# Patient Record
Sex: FEMALE | Race: WHITE | NOT HISPANIC OR LATINO
[De-identification: names, ages, dates, MRNs, and addresses within clinical notes are randomized per-mention and may not be internally consistent; named-entity substitution may affect disease eponyms.]

---

## 2017-08-03 ENCOUNTER — APPOINTMENT (OUTPATIENT)
Dept: DERMATOLOGY | Facility: CLINIC | Age: 47
End: 2017-08-03
Payer: COMMERCIAL

## 2017-08-03 PROCEDURE — 17000 DESTRUCT PREMALG LESION: CPT

## 2017-08-03 PROCEDURE — 99214 OFFICE O/P EST MOD 30 MIN: CPT | Mod: 25

## 2018-01-18 ENCOUNTER — APPOINTMENT (OUTPATIENT)
Dept: FAMILY MEDICINE | Facility: CLINIC | Age: 48
End: 2018-01-18
Payer: COMMERCIAL

## 2018-01-18 ENCOUNTER — LABORATORY RESULT (OUTPATIENT)
Age: 48
End: 2018-01-18

## 2018-01-18 VITALS
HEART RATE: 91 BPM | OXYGEN SATURATION: 99 % | SYSTOLIC BLOOD PRESSURE: 120 MMHG | HEIGHT: 64 IN | BODY MASS INDEX: 21.51 KG/M2 | WEIGHT: 126 LBS | DIASTOLIC BLOOD PRESSURE: 80 MMHG

## 2018-01-18 DIAGNOSIS — Z23 ENCOUNTER FOR IMMUNIZATION: ICD-10-CM

## 2018-01-18 PROCEDURE — 36415 COLL VENOUS BLD VENIPUNCTURE: CPT

## 2018-01-18 PROCEDURE — G0008: CPT

## 2018-01-18 PROCEDURE — 90686 IIV4 VACC NO PRSV 0.5 ML IM: CPT

## 2018-01-18 PROCEDURE — 99396 PREV VISIT EST AGE 40-64: CPT | Mod: 25

## 2018-01-23 LAB
25(OH)D3 SERPL-MCNC: 32.1 NG/ML
ALBUMIN SERPL ELPH-MCNC: 4.4 G/DL
ALP BLD-CCNC: 62 U/L
ALT SERPL-CCNC: 17 U/L
ANION GAP SERPL CALC-SCNC: 16 MMOL/L
APPEARANCE: ABNORMAL
AST SERPL-CCNC: 28 U/L
BASOPHILS # BLD AUTO: 0.02 K/UL
BASOPHILS NFR BLD AUTO: 0.4 %
BILIRUB SERPL-MCNC: 0.7 MG/DL
BILIRUBIN URINE: NEGATIVE
BLOOD URINE: NEGATIVE
BUN SERPL-MCNC: 16 MG/DL
CALCIUM SERPL-MCNC: 9.8 MG/DL
CHLORIDE SERPL-SCNC: 104 MMOL/L
CHOLEST SERPL-MCNC: 238 MG/DL
CHOLEST/HDLC SERPL: 2.2 RATIO
CO2 SERPL-SCNC: 23 MMOL/L
COLOR: YELLOW
CREAT SERPL-MCNC: 0.95 MG/DL
EOSINOPHIL # BLD AUTO: 0.27 K/UL
EOSINOPHIL NFR BLD AUTO: 5.1 %
GLUCOSE QUALITATIVE U: NEGATIVE MG/DL
GLUCOSE SERPL-MCNC: 75 MG/DL
HBA1C MFR BLD HPLC: 5.3 %
HCT VFR BLD CALC: 44.6 %
HDLC SERPL-MCNC: 109 MG/DL
HGB BLD-MCNC: 14.3 G/DL
IMM GRANULOCYTES NFR BLD AUTO: 0.2 %
KETONES URINE: NEGATIVE
LDLC SERPL CALC-MCNC: 115 MG/DL
LEUKOCYTE ESTERASE URINE: NEGATIVE
LYMPHOCYTES # BLD AUTO: 1.94 K/UL
LYMPHOCYTES NFR BLD AUTO: 36.3 %
MAN DIFF?: NORMAL
MCHC RBC-ENTMCNC: 30.1 PG
MCHC RBC-ENTMCNC: 32.1 GM/DL
MCV RBC AUTO: 93.9 FL
MONOCYTES # BLD AUTO: 0.49 K/UL
MONOCYTES NFR BLD AUTO: 9.2 %
NEUTROPHILS # BLD AUTO: 2.61 K/UL
NEUTROPHILS NFR BLD AUTO: 48.8 %
NITRITE URINE: NEGATIVE
PH URINE: 7
PLATELET # BLD AUTO: 230 K/UL
POTASSIUM SERPL-SCNC: 4.9 MMOL/L
PROT SERPL-MCNC: 7.3 G/DL
PROTEIN URINE: ABNORMAL MG/DL
RBC # BLD: 4.75 M/UL
RBC # FLD: 13.3 %
SODIUM SERPL-SCNC: 143 MMOL/L
SPECIFIC GRAVITY URINE: 1.03
TRIGL SERPL-MCNC: 71 MG/DL
TSH SERPL-ACNC: 2.17 UIU/ML
UROBILINOGEN URINE: NEGATIVE MG/DL
WBC # FLD AUTO: 5.34 K/UL

## 2018-01-26 ENCOUNTER — APPOINTMENT (OUTPATIENT)
Dept: DERMATOLOGY | Facility: CLINIC | Age: 48
End: 2018-01-26
Payer: COMMERCIAL

## 2018-01-26 PROCEDURE — 99212 OFFICE O/P EST SF 10 MIN: CPT | Mod: 25

## 2018-01-26 PROCEDURE — 17000 DESTRUCT PREMALG LESION: CPT

## 2018-01-26 PROCEDURE — 17003 DESTRUCT PREMALG LES 2-14: CPT

## 2018-06-24 ENCOUNTER — TRANSCRIPTION ENCOUNTER (OUTPATIENT)
Age: 48
End: 2018-06-24

## 2018-07-31 ENCOUNTER — RESULT REVIEW (OUTPATIENT)
Age: 48
End: 2018-07-31

## 2018-07-31 ENCOUNTER — APPOINTMENT (OUTPATIENT)
Dept: DERMATOLOGY | Facility: CLINIC | Age: 48
End: 2018-07-31
Payer: COMMERCIAL

## 2018-07-31 PROCEDURE — 99214 OFFICE O/P EST MOD 30 MIN: CPT | Mod: 25

## 2018-07-31 PROCEDURE — 11100 BX SKIN SUBCUTANEOUS&/MUCOUS MEMBRANE 1 LESION: CPT

## 2018-08-14 ENCOUNTER — APPOINTMENT (OUTPATIENT)
Dept: DERMATOLOGY | Facility: CLINIC | Age: 48
End: 2018-08-14

## 2018-08-21 ENCOUNTER — RESULT REVIEW (OUTPATIENT)
Age: 48
End: 2018-08-21

## 2018-08-22 ENCOUNTER — APPOINTMENT (OUTPATIENT)
Dept: DERMATOLOGY | Facility: CLINIC | Age: 48
End: 2018-08-22
Payer: COMMERCIAL

## 2018-08-22 PROCEDURE — 13101 CMPLX RPR TRUNK 2.6-7.5 CM: CPT | Mod: 59

## 2018-08-22 PROCEDURE — 99213 OFFICE O/P EST LOW 20 MIN: CPT | Mod: 25

## 2018-08-22 PROCEDURE — 11402 EXC TR-EXT B9+MARG 1.1-2 CM: CPT | Mod: 59

## 2018-08-22 PROCEDURE — 17000 DESTRUCT PREMALG LESION: CPT

## 2018-09-04 ENCOUNTER — APPOINTMENT (OUTPATIENT)
Dept: DERMATOLOGY | Facility: CLINIC | Age: 48
End: 2018-09-04
Payer: COMMERCIAL

## 2018-09-04 PROCEDURE — ZZZZZ: CPT

## 2019-01-25 ENCOUNTER — APPOINTMENT (OUTPATIENT)
Dept: DERMATOLOGY | Facility: CLINIC | Age: 49
End: 2019-01-25
Payer: COMMERCIAL

## 2019-01-25 PROCEDURE — 17003 DESTRUCT PREMALG LES 2-14: CPT

## 2019-01-25 PROCEDURE — 17000 DESTRUCT PREMALG LESION: CPT

## 2019-01-25 PROCEDURE — 99213 OFFICE O/P EST LOW 20 MIN: CPT | Mod: 25

## 2019-02-05 ENCOUNTER — APPOINTMENT (OUTPATIENT)
Dept: DERMATOLOGY | Facility: CLINIC | Age: 49
End: 2019-02-05
Payer: COMMERCIAL

## 2019-02-05 PROCEDURE — 99212 OFFICE O/P EST SF 10 MIN: CPT

## 2019-03-18 ENCOUNTER — LABORATORY RESULT (OUTPATIENT)
Age: 49
End: 2019-03-18

## 2019-03-18 ENCOUNTER — APPOINTMENT (OUTPATIENT)
Dept: FAMILY MEDICINE | Facility: CLINIC | Age: 49
End: 2019-03-18
Payer: COMMERCIAL

## 2019-03-18 VITALS
WEIGHT: 130 LBS | HEIGHT: 64 IN | HEART RATE: 86 BPM | SYSTOLIC BLOOD PRESSURE: 118 MMHG | BODY MASS INDEX: 22.2 KG/M2 | DIASTOLIC BLOOD PRESSURE: 76 MMHG

## 2019-03-18 DIAGNOSIS — M19.079 PRIMARY OSTEOARTHRITIS, UNSPECIFIED ANKLE AND FOOT: ICD-10-CM

## 2019-03-18 PROCEDURE — 99396 PREV VISIT EST AGE 40-64: CPT | Mod: 25

## 2019-03-18 PROCEDURE — 36415 COLL VENOUS BLD VENIPUNCTURE: CPT

## 2019-03-18 NOTE — HEALTH RISK ASSESSMENT
[Very Good] : ~his/her~  mood as very good [Intercurrent Urgi Care visits] : went to urgent care [No falls in past year] : Patient reported no falls in the past year [0] : 2) Feeling down, depressed, or hopeless: Not at all (0) [Patient reported mammogram was normal] : Patient reported mammogram was normal [None] : None [With Family] : lives with family [Employed] : employed [] :  [# Of Children ___] : has [unfilled] children [Reports changes in vision] : Reports changes in vision [Smoke Detector] : smoke detector [Seat Belt] :  uses seat belt [Sunscreen] : uses sunscreen [] : No [de-identified] : for Gastro illness  [de-identified] : GYN  Dr Alas   annually    DERM   DR. Silva  2-3 times per year  BENIGN LEsions  [de-identified] : GYM 5-6 days per week  HIT and Raul  [de-identified] : no processed foods    [[id number]] : 0 [Change in mental status noted] : No change in mental status noted [Reports changes in hearing] : Reports no changes in hearing [Reports changes in dental health] : Reports no changes in dental health [Guns at Home] : no guns at home [MammogramDate] : 5/18  [FreeTextEntry2] : 6 th    SCIENCE and READING  [de-identified] : now wears glasses for driving

## 2019-03-18 NOTE — PAST MEDICAL HISTORY
[Menstruating] : hx of menstruating [Menarche Age ____] : age at menarche was [unfilled] [Regular Cycle Intervals] : have been regular [Total Preg ___] : G: [unfilled] [Live Births___] : P: [unfilled]

## 2019-03-18 NOTE — PHYSICAL EXAM
[General Appearance - Alert] : alert [General Appearance - In No Acute Distress] : in no acute distress [General Appearance - Well-Appearing] : healthy appearing [Sclera] : the sclera and conjunctiva were normal [PERRL With Normal Accommodation] : pupils were equal in size, round, and reactive to light [Both Tympanic Membranes Were Examined] : both tympanic membranes were normal [Extraocular Movements] : extraocular movements were intact [Oropharynx] : the oropharynx was normal [Nasal Cavity] : the nasal mucosa and septum were normal [Thyroid Diffuse Enlargement] : the thyroid was not enlarged [Respiration, Rhythm And Depth] : normal respiratory rhythm and effort [Auscultation Breath Sounds / Voice Sounds] : lungs were clear to auscultation bilaterally [Heart Rate And Rhythm] : heart rate was normal and rhythm regular [Heart Sounds] : normal S1 and S2 [Murmurs] : no murmurs [Full Pulse] : the pedal pulses are present [Edema] : there was no peripheral edema [Abdomen Soft] : soft [Abdomen Tenderness] : non-tender [Abdomen Mass (___ Cm)] : no abdominal mass palpated [Cervical Lymph Nodes Enlarged Posterior Bilaterally] : posterior cervical [Cervical Lymph Nodes Enlarged Anterior Bilaterally] : anterior cervical [Supraclavicular Lymph Nodes Enlarged Bilaterally] : supraclavicular [No Spinal Tenderness] : no spinal tenderness [Abnormal Walk] : normal gait [Involuntary Movements] : no involuntary movements were seen [Skin Turgor] : normal skin turgor [] : no rash [No Focal Deficits] : no focal deficits [Deep Tendon Reflexes (DTR)] : deep tendon reflexes were 2+ and symmetric [Impaired Insight] : insight and judgment were intact [Oriented To Time, Place, And Person] : oriented to person, place, and time [FreeTextEntry1] :  EKG  2015   unchanged from EKG patient provided from 2009 encounter with cardiology.

## 2019-03-18 NOTE — REVIEW OF SYSTEMS
[Anxiety] : anxiety [Negative] : Neurological [Fever] : no fever [Chills] : no chills [Red Eyes] : eyes not red [Eyesight Problems] : no eyesight problems [Depression] : no depression

## 2019-03-18 NOTE — HISTORY OF PRESENT ILLNESS
[Health Maintenance] : health maintenance [GYN Evaluation] : gynecology [___ Year(s) Ago] : [unfilled] year(s) ago [] :  [Working Full Time] : working full time [Never Smoked Cigarettes] : has never smoked cigarettes [Rare Use] : rare alcohol use [Good] : good [Reg. Dental Visits] : She has regular dental visits [Vision Problems] : She denies vision problems [Hearing Loss] : She denies hearing loss [Healthy Diet] : She consumes a diverse and healthy diet [Menstrual Problems] : she reports normal menses [Regular Exercise] : She exercises regularly [Contraception] : she uses contraception [Sexually Active] : she is sexually active [de-identified] :   [de-identified] : MAMMOGRAPHY   As per GYN   Dr. Alas     March 2017   [place]   will request. \par \par Derm    Septum   2-3 times per year   no suspicious lesions  [FreeTextEntry8] : Continues to Runs 3 miles most days of the week on treadmill;  participates in RACES [FreeTextEntry1] : \par \par  [de-identified] : Since has been well .\par Continues to struggle a bit with anxiety most especially with regard to her health and weight.\par Did have injuries and followed with podiatry for plantar fascitis and bone spur.\par Pt and rest from exercise and fully resolved. \par \par Migraines episodes relieved by Ibuprofen.  NO specific pattern.  \par \par Has resumed running.

## 2019-03-18 NOTE — DISCUSSION/SUMMARY
[Normal Weight (BMI <25)] : normal weight - BMI <25 [Mediterranean diet recommended] : Mediterranean diet recommended [Resistance training 2 days per week recommended] : Resistance training 2 days per week recommended [Non - Smoker] : non-smoker [FreeTextEntry2] : Continue CURRENT  activities   [FreeTextEntry3] : Advised increased vegetables, fiber, fruits [FreeTextEntry1] : Well exam fro 47 year old WF with PMH of anxiety and migraines doing well. \par \par See HPI and Plan  \par \par Referral given to podiatry \par LAbs in office today.\par Flu Vac given

## 2019-03-18 NOTE — ASSESSMENT
[FreeTextEntry1] :  Well exam for 48  year old WF with PMH as stated in HPI / active list. \par \par Management : \par \par \par See HPI and Plan\par \par Labs in office today.   Will advise. \par \par Best wishes offered !\par

## 2019-03-20 LAB
ALBUMIN SERPL ELPH-MCNC: 4.2 G/DL
ALP BLD-CCNC: 56 U/L
ALT SERPL-CCNC: 19 U/L
ANION GAP SERPL CALC-SCNC: 11 MMOL/L
APPEARANCE: ABNORMAL
AST SERPL-CCNC: 26 U/L
BASOPHILS # BLD AUTO: 0.05 K/UL
BASOPHILS NFR BLD AUTO: 1 %
BILIRUB SERPL-MCNC: 0.3 MG/DL
BILIRUBIN URINE: NEGATIVE
BLOOD URINE: NEGATIVE
BUN SERPL-MCNC: 15 MG/DL
CALCIUM SERPL-MCNC: 9 MG/DL
CHLORIDE SERPL-SCNC: 106 MMOL/L
CO2 SERPL-SCNC: 23 MMOL/L
COLOR: YELLOW
CREAT SERPL-MCNC: 0.97 MG/DL
EOSINOPHIL # BLD AUTO: 0.48 K/UL
EOSINOPHIL NFR BLD AUTO: 9.9 %
GLUCOSE QUALITATIVE U: NEGATIVE
GLUCOSE SERPL-MCNC: 79 MG/DL
HCT VFR BLD CALC: 42.2 %
HGB BLD-MCNC: 13.2 G/DL
IMM GRANULOCYTES NFR BLD AUTO: 0 %
KETONES URINE: NEGATIVE
LEUKOCYTE ESTERASE URINE: NEGATIVE
LYMPHOCYTES # BLD AUTO: 1.84 K/UL
LYMPHOCYTES NFR BLD AUTO: 37.9 %
MAN DIFF?: NORMAL
MCHC RBC-ENTMCNC: 30.1 PG
MCHC RBC-ENTMCNC: 31.3 GM/DL
MCV RBC AUTO: 96.3 FL
MONOCYTES # BLD AUTO: 0.51 K/UL
MONOCYTES NFR BLD AUTO: 10.5 %
NEUTROPHILS # BLD AUTO: 1.98 K/UL
NEUTROPHILS NFR BLD AUTO: 40.7 %
NITRITE URINE: NEGATIVE
PH URINE: 5.5
PLATELET # BLD AUTO: 213 K/UL
POTASSIUM SERPL-SCNC: 4.2 MMOL/L
PROT SERPL-MCNC: 6.5 G/DL
PROTEIN URINE: NEGATIVE
RBC # BLD: 4.38 M/UL
RBC # FLD: 13.2 %
SODIUM SERPL-SCNC: 140 MMOL/L
SPECIFIC GRAVITY URINE: 1.02
UROBILINOGEN URINE: NORMAL
WBC # FLD AUTO: 4.86 K/UL

## 2019-07-18 ENCOUNTER — APPOINTMENT (OUTPATIENT)
Dept: FAMILY MEDICINE | Facility: CLINIC | Age: 49
End: 2019-07-18
Payer: COMMERCIAL

## 2019-07-18 VITALS
WEIGHT: 131 LBS | OXYGEN SATURATION: 99 % | HEART RATE: 66 BPM | HEIGHT: 64 IN | BODY MASS INDEX: 22.36 KG/M2 | SYSTOLIC BLOOD PRESSURE: 122 MMHG | DIASTOLIC BLOOD PRESSURE: 70 MMHG

## 2019-07-18 PROCEDURE — 99213 OFFICE O/P EST LOW 20 MIN: CPT

## 2019-07-24 NOTE — REVIEW OF SYSTEMS
[Anxiety] : anxiety [Negative] : Gastrointestinal [de-identified] : "worried it could be MS  or something serious"

## 2019-07-24 NOTE — HISTORY OF PRESENT ILLNESS
[FreeTextEntry8] : Last seen for CPE in [facility] and encounter reviewed.\par SInce continues to be very active in physical activity runs alternates with walking.\par \par In recent weeks has some heightened concerns for a sense of numbness "pins and needles" intermittently on shin area of Left limb.\par No weakness . Continues her exercise. \par

## 2019-07-24 NOTE — PHYSICAL EXAM
[No Acute Distress] : no acute distress [No Respiratory Distress] : no respiratory distress  [Normal Sclera/Conjunctiva] : normal sclera/conjunctiva [Normal Rate] : normal rate  [Regular Rhythm] : with a regular rhythm [No Accessory Muscle Use] : no accessory muscle use [No Varicosities] : no varicosities [No Edema] : there was no peripheral edema [Pedal Pulses Present] : the pedal pulses are present [No Extremity Clubbing/Cyanosis] : no extremity clubbing/cyanosis [No Focal Deficits] : no focal deficits [Grossly Normal Strength/Tone] : grossly normal strength/tone [No Joint Swelling] : no joint swelling [Deep Tendon Reflexes (DTR)] : deep tendon reflexes were 2+ and symmetric [Normal Gait] : normal gait [Normal Insight/Judgement] : insight and judgment were intact [Alert and Oriented x3] : oriented to person, place, and time [de-identified] : smiling   a bit anxious Self Reports  [de-identified] : MERARY [de-identified] : PIN test to LLE  fully intact to sharp/dull testing

## 2019-07-24 NOTE — ASSESSMENT
[FreeTextEntry1] : At this time she is reassured not likely serious.\par Advised to continue to monitor for resolution or worsening and call me if concerns persist.

## 2019-08-16 ENCOUNTER — APPOINTMENT (OUTPATIENT)
Dept: DERMATOLOGY | Facility: CLINIC | Age: 49
End: 2019-08-16
Payer: COMMERCIAL

## 2019-08-16 PROCEDURE — 17000 DESTRUCT PREMALG LESION: CPT

## 2019-08-16 PROCEDURE — 99214 OFFICE O/P EST MOD 30 MIN: CPT | Mod: 25

## 2019-08-16 PROCEDURE — 17003 DESTRUCT PREMALG LES 2-14: CPT

## 2019-09-23 ENCOUNTER — MEDICATION RENEWAL (OUTPATIENT)
Age: 49
End: 2019-09-23

## 2020-04-09 ENCOUNTER — APPOINTMENT (OUTPATIENT)
Dept: FAMILY MEDICINE | Facility: CLINIC | Age: 50
End: 2020-04-09
Payer: COMMERCIAL

## 2020-04-09 PROCEDURE — G2012 BRIEF CHECK IN BY MD/QHP: CPT

## 2020-07-16 ENCOUNTER — APPOINTMENT (OUTPATIENT)
Dept: FAMILY MEDICINE | Facility: CLINIC | Age: 50
End: 2020-07-16
Payer: COMMERCIAL

## 2020-07-16 VITALS
HEART RATE: 90 BPM | TEMPERATURE: 98.5 F | WEIGHT: 123 LBS | SYSTOLIC BLOOD PRESSURE: 100 MMHG | HEIGHT: 64 IN | BODY MASS INDEX: 21 KG/M2 | DIASTOLIC BLOOD PRESSURE: 80 MMHG

## 2020-07-16 DIAGNOSIS — K64.9 UNSPECIFIED HEMORRHOIDS: ICD-10-CM

## 2020-07-16 DIAGNOSIS — Z87.898 PERSONAL HISTORY OF OTHER SPECIFIED CONDITIONS: ICD-10-CM

## 2020-07-16 PROCEDURE — 36415 COLL VENOUS BLD VENIPUNCTURE: CPT

## 2020-07-16 PROCEDURE — 81003 URINALYSIS AUTO W/O SCOPE: CPT | Mod: QW

## 2020-07-16 PROCEDURE — 99396 PREV VISIT EST AGE 40-64: CPT | Mod: 25

## 2020-07-19 NOTE — ASSESSMENT
[FreeTextEntry1] :  Well exam for 49 year old WF  with PMH as stated in HPI / active list. \par \par Management : \par \par \par See HPI and Plan\par \par Labs in office today.   Will advise. \par \par Best wishes offered !\par

## 2020-07-19 NOTE — PHYSICAL EXAM
[No Acute Distress] : no acute distress [Normal Sclera/Conjunctiva] : normal sclera/conjunctiva [No Accessory Muscle Use] : no accessory muscle use [No Respiratory Distress] : no respiratory distress  [No Varicosities] : no varicosities [Regular Rhythm] : with a regular rhythm [Normal Rate] : normal rate  [No Edema] : there was no peripheral edema [Pedal Pulses Present] : the pedal pulses are present [No Extremity Clubbing/Cyanosis] : no extremity clubbing/cyanosis [No Joint Swelling] : no joint swelling [No Focal Deficits] : no focal deficits [Normal Gait] : normal gait [Grossly Normal Strength/Tone] : grossly normal strength/tone [Alert and Oriented x3] : oriented to person, place, and time [Deep Tendon Reflexes (DTR)] : deep tendon reflexes were 2+ and symmetric [Normal Insight/Judgement] : insight and judgment were intact [de-identified] : smiling   a bit anxious Self Reports  [de-identified] : MERARY [de-identified] : PIN test to LLE  fully intact to sharp/dull testing

## 2020-07-19 NOTE — HEALTH RISK ASSESSMENT
[Excellent] : ~his/her~  mood as  excellent [FreeTextEntry1] : COVID [] : No [Yes] : Yes [2 - 4 times a month (2 pts)] : 2-4 times a month (2 points) [1 or 2 (0 pts)] : 1 or 2 (0 points) [Never (0 pts)] : Never (0 points) [No falls in past year] : Patient reported no falls in the past year [de-identified] : Denies [de-identified] : GYN [de-identified] : WALKS  "almost every day"  [de-identified] : Healthy !  [Patient reported mammogram was normal] : Patient reported mammogram was normal [Patient reported PAP Smear was normal] : Patient reported PAP Smear was normal [Change in mental status noted] : No change in mental status noted [None] : None [# of Members in Household ___] :  household currently consist of [unfilled] member(s) [With Family] : lives with family [] :  [# Of Children ___] : has [unfilled] children [Feels Safe at Home] : Feels safe at home [Graduate School] : graduate school [Reports changes in dental health] : Reports no changes in dental health [Fully functional (bathing, dressing, toileting, transferring, walking, feeding)] : Fully functional (bathing, dressing, toileting, transferring, walking, feeding) [Fully functional (using the telephone, shopping, preparing meals, housekeeping, doing laundry, using] : Fully functional and needs no help or supervision to perform IADLs (using the telephone, shopping, preparing meals, housekeeping, doing laundry, using transportation, managing medications and managing finances) [Carbon Monoxide Detector] : carbon monoxide detector [Smoke Detector] : smoke detector [Guns at Home] : no guns at home [Travel to Developing Areas] : does not  travel to developing areas [MammogramDate] : 6/ 2019  [Seat Belt] :  uses seat belt [PapSmearDate] : 2019  [MammogramComments] : self report   will request  [ColonoscopyDate] : will  refer

## 2020-07-19 NOTE — HISTORY OF PRESENT ILLNESS
[FreeTextEntry8] : Last seen for CPE in [facility] and encounter reviewed.\par SInce continues to be very active in physical activity runs alternates with walking.\par \par In recent weeks has some heightened concerns for a sense of numbness "pins and needles" intermittently on shin area of Left limb.\par No weakness . Continues her exercise. \par  [FreeTextEntry1] : CPE \par Last seen for same March 2019  [de-identified] : Seen for acute visit in JULY ; condition resolved.\par Telehealth visit April for refill of Prozac and encounter reviewed. Doing well! 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 \par Stressed but walks everyday.  Stopped running.  \par

## 2020-07-20 LAB
ALBUMIN SERPL ELPH-MCNC: 4.8 G/DL
ALP BLD-CCNC: 54 U/L
ALT SERPL-CCNC: 15 U/L
ANION GAP SERPL CALC-SCNC: 15 MMOL/L
AST SERPL-CCNC: 28 U/L
BASOPHILS # BLD AUTO: 0.03 K/UL
BASOPHILS NFR BLD AUTO: 0.5 %
BILIRUB SERPL-MCNC: 0.5 MG/DL
BILIRUB UR QL STRIP: NEGATIVE
BUN SERPL-MCNC: 16 MG/DL
CALCIUM SERPL-MCNC: 9.4 MG/DL
CHLORIDE SERPL-SCNC: 103 MMOL/L
CHOLEST SERPL-MCNC: 226 MG/DL
CHOLEST/HDLC SERPL: 2.1 RATIO
CLARITY UR: CLEAR
CO2 SERPL-SCNC: 23 MMOL/L
COLLECTION METHOD: NORMAL
CREAT SERPL-MCNC: 0.95 MG/DL
EOSINOPHIL # BLD AUTO: 0.21 K/UL
EOSINOPHIL NFR BLD AUTO: 3.5 %
ESTIMATED AVERAGE GLUCOSE: 103 MG/DL
GLUCOSE SERPL-MCNC: 73 MG/DL
GLUCOSE UR-MCNC: NEGATIVE
HBA1C MFR BLD HPLC: 5.2 %
HCG UR QL: 0.2 EU/DL
HCT VFR BLD CALC: 45.3 %
HDLC SERPL-MCNC: 110 MG/DL
HGB BLD-MCNC: 13.8 G/DL
HGB UR QL STRIP.AUTO: NEGATIVE
IMM GRANULOCYTES NFR BLD AUTO: 0.2 %
KETONES UR-MCNC: ABNORMAL
LDLC SERPL CALC-MCNC: 105 MG/DL
LEUKOCYTE ESTERASE UR QL STRIP: NEGATIVE
LYMPHOCYTES # BLD AUTO: 1.76 K/UL
LYMPHOCYTES NFR BLD AUTO: 29.5 %
MAN DIFF?: NORMAL
MCHC RBC-ENTMCNC: 29.8 PG
MCHC RBC-ENTMCNC: 30.5 GM/DL
MCV RBC AUTO: 97.8 FL
MONOCYTES # BLD AUTO: 0.66 K/UL
MONOCYTES NFR BLD AUTO: 11.1 %
NEUTROPHILS # BLD AUTO: 3.29 K/UL
NEUTROPHILS NFR BLD AUTO: 55.2 %
NITRITE UR QL STRIP: NEGATIVE
PH UR STRIP: 6
PLATELET # BLD AUTO: 225 K/UL
POTASSIUM SERPL-SCNC: 4.4 MMOL/L
PROT SERPL-MCNC: 7 G/DL
PROT UR STRIP-MCNC: NEGATIVE
RBC # BLD: 4.63 M/UL
RBC # FLD: 13 %
SODIUM SERPL-SCNC: 141 MMOL/L
SP GR UR STRIP: 1.02
TRIGL SERPL-MCNC: 55 MG/DL
TSH SERPL-ACNC: 1.4 UIU/ML
WBC # FLD AUTO: 5.96 K/UL

## 2020-08-07 LAB — HEMOCCULT STL QL IA: NEGATIVE

## 2021-03-04 ENCOUNTER — APPOINTMENT (OUTPATIENT)
Dept: ORTHOPEDIC SURGERY | Facility: CLINIC | Age: 51
End: 2021-03-04
Payer: COMMERCIAL

## 2021-03-04 VITALS
HEIGHT: 64 IN | WEIGHT: 125 LBS | BODY MASS INDEX: 21.34 KG/M2 | DIASTOLIC BLOOD PRESSURE: 83 MMHG | SYSTOLIC BLOOD PRESSURE: 129 MMHG | HEART RATE: 88 BPM

## 2021-03-04 PROCEDURE — 73564 X-RAY EXAM KNEE 4 OR MORE: CPT | Mod: RT

## 2021-03-04 PROCEDURE — 99204 OFFICE O/P NEW MOD 45 MIN: CPT

## 2021-03-04 PROCEDURE — 99072 ADDL SUPL MATRL&STAF TM PHE: CPT

## 2021-03-04 NOTE — PHYSICAL EXAM
[de-identified] : The patient appears well nourished  and in no apparent distress.  The patient is alert and oriented to person, place, and time.   Affect and mood appear normal. The head is normocephalic and atraumatic.  The eyes reveal normal sclera and extra ocular muscles are intact. The tongue is midline with no apparent lesions.  Skin shows normal turgor with no evidence of eczema or psoriasis.  No respiratory distress noted.  Sensation grossly intact.\par   [de-identified] : Exam of the right knee shows a small effusion, full extension, no posterolateral joint line tenderness, pain over the anterior horn lateral meniscus with palpation, flexion of 140 degrees. 5/5 motor strength bilaterally distally. Sensation intact distally.  [de-identified] : Xray- 4 views of the right knee shows minimal arthritis of the patellofemoral compartment of the right knee.

## 2021-03-04 NOTE — CONSULT LETTER
[Dear  ___] : Dear  [unfilled], [Consult Letter:] : I had the pleasure of evaluating your patient, [unfilled]. [Please see my note below.] : Please see my note below. [Consult Closing:] : Thank you very much for allowing me to participate in the care of this patient.  If you have any questions, please do not hesitate to contact me. [Sincerely,] : Sincerely, [FreeTextEntry2] : ONEIL TAYLOR MD\par  [FreeTextEntry3] : Rosas Blair MD\par Chief of Joint Replacement\par [facility]\par [facility]\par

## 2021-03-04 NOTE — DISCUSSION/SUMMARY
[de-identified] : The patient is a 50 year old female with a possible lateral meniscus tear of the right knee. She was sent for an MRI of the right knee to further evaluate for this. In the meantime we discussed the use of over-the-counter anti-inflammatories as well as activity modification and ice as needed. She will follow up with the results of the MRI for further treatment options.

## 2021-03-04 NOTE — ADDENDUM
[FreeTextEntry1] : This note was authored by Casper Gutierrez working as a medical scribe for Dr. Rosas Blair. The note was reviewed, edited, and revised by Dr. Rosas Blair whom is in agreement with the exam findings, imaging findings, and treatment plan. 03/04/2021.

## 2021-03-04 NOTE — HISTORY OF PRESENT ILLNESS
[de-identified] : The patient is a 50 year old female being seen for evaluation of her right knee. She reports in January feeling a painful sensation while walking on the treadmill. She denies specific injury or trauma. She reports mild swelling following initial onset of pain. She reports pain is localized to the lateral aspect of the knee. She notes pain has remained intermittent over the last 2 months. She reports increased pain with activity and especially while extending the knee from a flexed position. She notes pain with torsional movements. She reports utilizing anti-inflammatories with mild relief of her symptoms. She comes in today for evaluation of her right knee.

## 2021-03-11 ENCOUNTER — OUTPATIENT (OUTPATIENT)
Dept: OUTPATIENT SERVICES | Facility: HOSPITAL | Age: 51
LOS: 1 days | End: 2021-03-11

## 2021-03-11 ENCOUNTER — APPOINTMENT (OUTPATIENT)
Dept: MRI IMAGING | Facility: CLINIC | Age: 51
End: 2021-03-11
Payer: COMMERCIAL

## 2021-03-11 DIAGNOSIS — S83.281A OTHER TEAR OF LATERAL MENISCUS, CURRENT INJURY, RIGHT KNEE, INITIAL ENCOUNTER: ICD-10-CM

## 2021-03-11 PROCEDURE — 73721 MRI JNT OF LWR EXTRE W/O DYE: CPT | Mod: 26,RT

## 2021-03-11 PROCEDURE — 70030 X-RAY EYE FOR FOREIGN BODY: CPT | Mod: 26,50

## 2021-03-19 ENCOUNTER — APPOINTMENT (OUTPATIENT)
Dept: ORTHOPEDIC SURGERY | Facility: CLINIC | Age: 51
End: 2021-03-19
Payer: COMMERCIAL

## 2021-03-19 PROCEDURE — 99213 OFFICE O/P EST LOW 20 MIN: CPT | Mod: 95

## 2021-03-19 NOTE — DISCUSSION/SUMMARY
[de-identified] : The patient is a 50 year old female with a lateral meniscus tear of the right knee. She was given a prescription for anti-inflammatories. I recommended a course of Mobic. The patient was given a prescription for the Mobic with directions. She was instructed to stop the medicine and call the office if there are any adverse reaction to the medicine. She was also instructed to consult with their primary care doctor prior to starting the medication. \par  \par She was advised that she may benefit from a right knee arthroscopy with partial meniscectomy. Risks, benefits and alternatives to surgery were discussed and all questions were answered. Recovery from surgery was also discussed. The percentages of success in an arthroscopy that involves a torn meniscus and arthritic changes is dependent upon how bad the arthritic changes are. Basically, removing a meniscal tear allows us to ascertain how bad the patient's articular cartilage destruction (arthritis) is. The arthroscopy cleans out any debris from the arthritic process as well as removing the meniscal tear. Approximately 75% of the patients will say that they feel relief, although their x-rays will continue to show significant arthritic changes. Arthroscopy for arthritis is a temporizing procedure, yielding subjective success (patient satisfaction) for less than two to five years. In some cases, the knee might eventually require a knee replacement for symptomatic relief. The prognostic factors that are somewhat favorable predictive values in arthroscopic debridements (removal of loose articular cartilage, loose body and inflamed synovium) of an arthritic knee are: short duration of symptoms, effusion (swelling), minimal deformity and good range of motion. The complications with any arthroscopy include the risk of anaesthetic complications and death, blood clots and pulmonary embolus, infection (less than 1%), nerve damage, by which we would mean a peroneal palsy (less than 0.1%) (small area of skin numbness is so common, we do not consider its presence a complication), injury to the popliteal artery, which is so rare that there are no statistics, but should it occur could theoretically lead to amputation, which is extremely unlikely. There is often a chance of getting a hemarthrosis (blood in the joint) but this usually resolves with local measures of icing, physical therapy, and aspiration. Reflex sympathetic dystrophy (RSD) is another extremely rare but theoretical complication. This (RSD) means that the patient has a stiff painful joint that is out of proportion to the objective pathology of the knee. Subsequently, it might require years of physical therapy before one regains a functional knee with RSD. Infrapatellar contracture syndrome (stiff joint) is sometimes reported and associated with RSD, but it usually is a result of not being aggressive in physical therapy. I think the patient understands the risk benefit ratio of arthroscopy and will think about whether they would prefer the nonoperative or surgical treatment option.

## 2021-03-19 NOTE — HISTORY OF PRESENT ILLNESS
[Home] : at home, [unfilled] , at the time of the visit. [Medical Office: ([facility])___] : at the medical office located in  [de-identified] : The patient is a 50 year old female being seen via telehealth for evaluation of her right knee. Last seen in the office 2 weeks ago at which time she was sent for an MRI to evaluate for a possible lateral meniscus tear of the right knee. She reports undergoing the MRI and results are available in the office for review today. She reports continued frequent pain since her previous visit. She notes pain is increased with weightbearing activities. She continues to have pain with weightbearing activities and torsional movements. She is being seen today for review of the results of the MRI and for treatment options.

## 2021-03-19 NOTE — REASON FOR VISIT
[Follow-Up Visit] : a follow-up visit for [Osteoarthritis, Knee] : osteoarthritis, knee [FreeTextEntry2] : MRI review.

## 2021-03-19 NOTE — PHYSICAL EXAM
[de-identified] : The patient is alert and oriented to person, place, and time. Affect and mood appear normal. No respiratory distress noted.  The patient speaks with normal nonpressured speech. [de-identified] : MRI - performed at [facility] on 3/11/2021 of the right knee- \par \par IMPRESSION:\par Nondisplaced undersurface tear of the anterior horn involving the peripheral substance of the lateral meniscus.\par Edema between the iliotibial band and lateral femur, which can be seen in the setting of iliotibial band friction syndrome.\par

## 2021-03-19 NOTE — ADDENDUM
[FreeTextEntry1] : This note was authored by Casper Gutierrez working as a medical scribe for Dr. Rosas Blair. The note was reviewed, edited, and revised by Dr. Rosas Blair whom is in agreement with the exam findings, imaging findings, and treatment plan. 03/19/2021.

## 2021-03-21 ENCOUNTER — RX RENEWAL (OUTPATIENT)
Age: 51
End: 2021-03-21

## 2021-03-23 ENCOUNTER — OUTPATIENT (OUTPATIENT)
Dept: OUTPATIENT SERVICES | Facility: HOSPITAL | Age: 51
LOS: 1 days | End: 2021-03-23
Payer: COMMERCIAL

## 2021-03-23 VITALS
HEART RATE: 74 BPM | WEIGHT: 128.31 LBS | TEMPERATURE: 97 F | DIASTOLIC BLOOD PRESSURE: 80 MMHG | RESPIRATION RATE: 81 BRPM | SYSTOLIC BLOOD PRESSURE: 140 MMHG | HEIGHT: 63 IN

## 2021-03-23 DIAGNOSIS — Z01.818 ENCOUNTER FOR OTHER PREPROCEDURAL EXAMINATION: ICD-10-CM

## 2021-03-23 DIAGNOSIS — S83.281A OTHER TEAR OF LATERAL MENISCUS, CURRENT INJURY, RIGHT KNEE, INITIAL ENCOUNTER: ICD-10-CM

## 2021-03-23 DIAGNOSIS — Z29.9 ENCOUNTER FOR PROPHYLACTIC MEASURES, UNSPECIFIED: ICD-10-CM

## 2021-03-23 LAB
ANION GAP SERPL CALC-SCNC: 9 MMOL/L — SIGNIFICANT CHANGE UP (ref 5–17)
APTT BLD: 29.2 SEC — SIGNIFICANT CHANGE UP (ref 27.5–35.5)
BASOPHILS # BLD AUTO: 0.03 K/UL — SIGNIFICANT CHANGE UP (ref 0–0.2)
BASOPHILS NFR BLD AUTO: 0.4 % — SIGNIFICANT CHANGE UP (ref 0–2)
BUN SERPL-MCNC: 22 MG/DL — HIGH (ref 8–20)
CALCIUM SERPL-MCNC: 8.7 MG/DL — SIGNIFICANT CHANGE UP (ref 8.6–10.2)
CHLORIDE SERPL-SCNC: 102 MMOL/L — SIGNIFICANT CHANGE UP (ref 98–107)
CO2 SERPL-SCNC: 26 MMOL/L — SIGNIFICANT CHANGE UP (ref 22–29)
CREAT SERPL-MCNC: 0.9 MG/DL — SIGNIFICANT CHANGE UP (ref 0.5–1.3)
EOSINOPHIL # BLD AUTO: 0.26 K/UL — SIGNIFICANT CHANGE UP (ref 0–0.5)
EOSINOPHIL NFR BLD AUTO: 3.7 % — SIGNIFICANT CHANGE UP (ref 0–6)
GLUCOSE SERPL-MCNC: 81 MG/DL — SIGNIFICANT CHANGE UP (ref 70–99)
HCT VFR BLD CALC: 43.1 % — SIGNIFICANT CHANGE UP (ref 34.5–45)
HGB BLD-MCNC: 13.3 G/DL — SIGNIFICANT CHANGE UP (ref 11.5–15.5)
IMM GRANULOCYTES NFR BLD AUTO: 0.3 % — SIGNIFICANT CHANGE UP (ref 0–1.5)
INR BLD: 1.08 RATIO — SIGNIFICANT CHANGE UP (ref 0.88–1.16)
LYMPHOCYTES # BLD AUTO: 2.09 K/UL — SIGNIFICANT CHANGE UP (ref 1–3.3)
LYMPHOCYTES # BLD AUTO: 29.4 % — SIGNIFICANT CHANGE UP (ref 13–44)
MCHC RBC-ENTMCNC: 28.4 PG — SIGNIFICANT CHANGE UP (ref 27–34)
MCHC RBC-ENTMCNC: 30.9 GM/DL — LOW (ref 32–36)
MCV RBC AUTO: 91.9 FL — SIGNIFICANT CHANGE UP (ref 80–100)
MONOCYTES # BLD AUTO: 0.66 K/UL — SIGNIFICANT CHANGE UP (ref 0–0.9)
MONOCYTES NFR BLD AUTO: 9.3 % — SIGNIFICANT CHANGE UP (ref 2–14)
NEUTROPHILS # BLD AUTO: 4.06 K/UL — SIGNIFICANT CHANGE UP (ref 1.8–7.4)
NEUTROPHILS NFR BLD AUTO: 56.9 % — SIGNIFICANT CHANGE UP (ref 43–77)
PLATELET # BLD AUTO: 222 K/UL — SIGNIFICANT CHANGE UP (ref 150–400)
POTASSIUM SERPL-MCNC: 4.3 MMOL/L — SIGNIFICANT CHANGE UP (ref 3.5–5.3)
POTASSIUM SERPL-SCNC: 4.3 MMOL/L — SIGNIFICANT CHANGE UP (ref 3.5–5.3)
PROTHROM AB SERPL-ACNC: 12.5 SEC — SIGNIFICANT CHANGE UP (ref 10.6–13.6)
RBC # BLD: 4.69 M/UL — SIGNIFICANT CHANGE UP (ref 3.8–5.2)
RBC # FLD: 12.4 % — SIGNIFICANT CHANGE UP (ref 10.3–14.5)
SODIUM SERPL-SCNC: 137 MMOL/L — SIGNIFICANT CHANGE UP (ref 135–145)
WBC # BLD: 7.12 K/UL — SIGNIFICANT CHANGE UP (ref 3.8–10.5)
WBC # FLD AUTO: 7.12 K/UL — SIGNIFICANT CHANGE UP (ref 3.8–10.5)

## 2021-03-23 PROCEDURE — 85025 COMPLETE CBC W/AUTO DIFF WBC: CPT

## 2021-03-23 PROCEDURE — 85610 PROTHROMBIN TIME: CPT

## 2021-03-23 PROCEDURE — 36415 COLL VENOUS BLD VENIPUNCTURE: CPT

## 2021-03-23 PROCEDURE — G0463: CPT

## 2021-03-23 PROCEDURE — 85730 THROMBOPLASTIN TIME PARTIAL: CPT

## 2021-03-23 PROCEDURE — 93010 ELECTROCARDIOGRAM REPORT: CPT

## 2021-03-23 PROCEDURE — 93005 ELECTROCARDIOGRAM TRACING: CPT

## 2021-03-23 PROCEDURE — 80048 BASIC METABOLIC PNL TOTAL CA: CPT

## 2021-03-23 NOTE — H&P PST ADULT - MUSCULOSKELETAL COMMENTS
right knee ache /pain lateral side of knee reports the unable to full extend leg or squat " I feel like a zinnger/ zap like pain. when I move a certain way" right knee ache /pain lateral side of knee reports the unable to full extend leg or squat " I feel like a zinger/ zap like pain. when I move a certain way"

## 2021-03-23 NOTE — H&P PST ADULT - ASSESSMENT
CAPRINI SCORE [CLOT]    AGE RELATED RISK FACTORS                                                       MOBILITY RELATED FACTORS  [ x] Age 41-60 years                                            (1 Point)                  [ ] Bed rest                                                        (1 Point)  [ ] Age: 61-74 years                                           (2 Points)                 [ ] Plaster cast                                                   (2 Points)  [ ] Age= 75 years                                              (3 Points)                   [ ] Bed bound for more than 72 hours                 (2 Points)    DISEASE RELATED RISK FACTORS                                               GENDER SPECIFIC FACTORS  [ ] Edema in the lower extremities                       (1 Point)              [ ] Pregnancy                                                     (1 Point)  [ ] Varicose veins                                               (1 Point)                     [ ] Post-partum < 6 weeks                                   (1 Point)             [x ] BMI > 25 Kg/m2                                            (1 Point)                     [ ] Hormonal therapy  or oral contraception          (1 Point)                 [ ] Sepsis (in the previous month)                        (1 Point)                [ ] History of pregnancy complications                 (1 point)  [ ] Pneumonia or serious lung disease                                                [ ] Unexplained or recurrent                     (1 Point)           (in the previous month)                               (1 Point)  [ ] Abnormal pulmonary function test                     (1 Point)                 SURGERY RELATED RISK FACTORS  [ ] Acute myocardial infarction                              (1 Point)                   [ ]  Section                                             (1 Point)  [ ] Congestive heart failure (in the previous month)  (1 Point)                 [x ] Minor surgery                                                  (1 Point)   [ ] Inflammatory bowel disease                             (1 Point)                   [ ] Arthroscopic surgery                                        (2 Points)  [ ] Central venous access                                      (2 Points)                    [ ] General surgery lasting more than 45 minutes   (2 Points)       [ ] Stroke (in the previous month)                          (5 Points)                 [ ] Elective arthroplasty                                         (5 Points)            [ ] Malignacy (past or present)                          (2 ponits)                                                                                                                            HEMATOLOGY RELATED FACTORS                                                 TRAUMA RELATED RISK FACTORS  [ ] Prior episodes of VTE                                     (3 Points)                [ ] Fracture of the hip, pelvis, or leg                       (5 Points)  [ ] Positive family history for VTE                         (3 Points)             [ ] Acute spinal cord injury (in the previous month)  (5 Points)  [ ] Prothrombin 84999 A                                     (3 Points)                [ ] Paralysis  (less than 1 month)                             (5 Points)  [ ] Factor V Leiden                                             (3 Points)                   [ ] Multiple Trauma within 1 month                        (5 Points)  [ ] Lupus anticoagulants                                     (3 Points)                                                           [ ] Anticardiolipin antibodies                               (3 Points)                                                       [ ] High homocysteine in the blood                      (3 Points)                                             [ ] Other congenital or acquired thrombophilia      (3 Points)                                                [ ] Heparin induced thrombocytopenia                  (3 Points)                                          Total Score [    3   ]    Caprini Score 0 - 2:  Low Risk, No VTE Prophylaxis required for most patients, encourage ambulation  Caprini Score 3 - 6:  At Risk, pharmacologic VTE prophylaxis is indicated for most patients (in the absence of a contraindication)  Caprini Score Greater than or = 7:  High Risk, pharmacologic VTE prophylaxis is indicated for most patients (in the absence of a contraindication)    50 year old female present with meniscus tear right.  She is now schedule for right knee arthroscopy with partial lateral menisectomy.   Patient educated on written and verbal preop instructions.   medications reviewed, instructions given on what medications to take and what not to take.  Pt instructed to stop Herbals or anti-inflamatory meds one week prior to surgery and discuss with PMD.

## 2021-03-23 NOTE — H&P PST ADULT - HISTORY OF PRESENT ILLNESS
50 year old present with meniscus tear right-  She report she was walking out on the treadmill right knee ache /pain lateral side of knee reports the unable to full extend leg or squat " I feel like a zinnger/ zap like pain. when I move a certain way" 50 year old female present with meniscus tear right.  She report she was walking on the treadmill and her right knee began to ache. She reports pain is  lateral side of knee. she reports is unable to fully extend leg or squat " I feel  a zinger/ zap like pain, when I move a certain way" She is now schedule for right knee arthroscopy with partial lateral menisectomy with Dr. Blair on 4/2/2021.

## 2021-03-23 NOTE — H&P PST ADULT - NSICDXPROBLEM_GEN_ALL_CORE_FT
PROBLEM DIAGNOSES  Problem: Tear of lateral meniscus of right knee  Assessment and Plan: right knee arthroscopy with partial lateral meniscectomy     Problem: Need for prophylactic measure  Assessment and Plan: low risk, the surgical team will order appropriate VTE prophylaxis

## 2021-03-30 ENCOUNTER — APPOINTMENT (OUTPATIENT)
Dept: DISASTER EMERGENCY | Facility: CLINIC | Age: 51
End: 2021-03-30

## 2021-03-31 LAB — SARS-COV-2 N GENE NPH QL NAA+PROBE: NOT DETECTED

## 2021-04-01 ENCOUNTER — TRANSCRIPTION ENCOUNTER (OUTPATIENT)
Age: 51
End: 2021-04-01

## 2021-04-02 ENCOUNTER — APPOINTMENT (OUTPATIENT)
Dept: ORTHOPEDIC SURGERY | Facility: HOSPITAL | Age: 51
End: 2021-04-02

## 2021-04-02 ENCOUNTER — OUTPATIENT (OUTPATIENT)
Dept: INPATIENT UNIT | Facility: HOSPITAL | Age: 51
LOS: 1 days | End: 2021-04-02
Payer: COMMERCIAL

## 2021-04-02 VITALS
HEART RATE: 67 BPM | SYSTOLIC BLOOD PRESSURE: 135 MMHG | OXYGEN SATURATION: 100 % | TEMPERATURE: 97 F | RESPIRATION RATE: 14 BRPM | DIASTOLIC BLOOD PRESSURE: 69 MMHG

## 2021-04-02 VITALS
HEIGHT: 63 IN | HEART RATE: 82 BPM | TEMPERATURE: 98 F | OXYGEN SATURATION: 100 % | WEIGHT: 128.31 LBS | SYSTOLIC BLOOD PRESSURE: 129 MMHG | DIASTOLIC BLOOD PRESSURE: 82 MMHG | RESPIRATION RATE: 16 BRPM

## 2021-04-02 DIAGNOSIS — S83.281A OTHER TEAR OF LATERAL MENISCUS, CURRENT INJURY, RIGHT KNEE, INITIAL ENCOUNTER: ICD-10-CM

## 2021-04-02 PROCEDURE — 29881 ARTHRS KNE SRG MNISECTMY M/L: CPT | Mod: RT

## 2021-04-02 RX ORDER — SODIUM CHLORIDE 9 MG/ML
1000 INJECTION, SOLUTION INTRAVENOUS
Refills: 0 | Status: DISCONTINUED | OUTPATIENT
Start: 2021-04-02 | End: 2021-04-02

## 2021-04-02 RX ORDER — OXYCODONE AND ACETAMINOPHEN 5; 325 MG/1; MG/1
2 TABLET ORAL EVERY 6 HOURS
Refills: 0 | Status: DISCONTINUED | OUTPATIENT
Start: 2021-04-02 | End: 2021-04-02

## 2021-04-02 RX ORDER — CEFAZOLIN SODIUM 1 G
2000 VIAL (EA) INJECTION ONCE
Refills: 0 | Status: DISCONTINUED | OUTPATIENT
Start: 2021-04-02 | End: 2021-04-02

## 2021-04-02 RX ORDER — TRAMADOL HYDROCHLORIDE 50 MG/1
50 TABLET ORAL EVERY 4 HOURS
Refills: 0 | Status: DISCONTINUED | OUTPATIENT
Start: 2021-04-02 | End: 2021-04-02

## 2021-04-02 RX ORDER — FENTANYL CITRATE 50 UG/ML
50 INJECTION INTRAVENOUS
Refills: 0 | Status: DISCONTINUED | OUTPATIENT
Start: 2021-04-02 | End: 2021-04-02

## 2021-04-02 RX ORDER — ONDANSETRON 8 MG/1
4 TABLET, FILM COATED ORAL ONCE
Refills: 0 | Status: DISCONTINUED | OUTPATIENT
Start: 2021-04-02 | End: 2021-04-02

## 2021-04-02 RX ORDER — FLUOXETINE HCL 10 MG
0 CAPSULE ORAL
Qty: 0 | Refills: 0 | DISCHARGE

## 2021-04-02 RX ORDER — OXYCODONE AND ACETAMINOPHEN 5; 325 MG/1; MG/1
1 TABLET ORAL EVERY 6 HOURS
Refills: 0 | Status: DISCONTINUED | OUTPATIENT
Start: 2021-04-02 | End: 2021-04-02

## 2021-04-02 RX ORDER — TRAMADOL HYDROCHLORIDE 50 MG/1
100 TABLET ORAL EVERY 6 HOURS
Refills: 0 | Status: DISCONTINUED | OUTPATIENT
Start: 2021-04-02 | End: 2021-04-02

## 2021-04-02 RX ORDER — SODIUM CHLORIDE 9 MG/ML
3 INJECTION INTRAMUSCULAR; INTRAVENOUS; SUBCUTANEOUS ONCE
Refills: 0 | Status: DISCONTINUED | OUTPATIENT
Start: 2021-04-02 | End: 2021-04-02

## 2021-04-02 NOTE — PHYSICAL THERAPY INITIAL EVALUATION ADULT - WEIGHT-BEARING RESTRICTIONS: STAND/SIT, REHAB EVAL
Normal vision: sees adequately in most situations; can see medication labels, newsprint
weight-bearing as tolerated

## 2021-04-02 NOTE — ASU DISCHARGE PLAN (ADULT/PEDIATRIC) - CARE PROVIDER_API CALL
Rosas Blair)  Orthopaedic Surgery  [address] [facility] [address]  [place] [address]  Phone: [phone]  Fax: [phone]  Follow Up Time:

## 2021-04-02 NOTE — PHYSICAL THERAPY INITIAL EVALUATION ADULT - ASR EQUIP NEEDS DISCH PT EVAL
standard cane
Mother  Still living? Unknown  Family history of atrial fibrillation, Age at diagnosis: Age Unknown  Family history of cardiac pacemaker, Age at diagnosis: Age Unknown  Family history of asthma, Age at diagnosis: Age Unknown  Family history of MI (myocardial infarction), Age at diagnosis: Age Unknown  Family history of pulmonary embolism, Age at diagnosis: Age Unknown

## 2021-04-02 NOTE — BRIEF OPERATIVE NOTE - NSICDXBRIEFPOSTOP_GEN_ALL_CORE_FT
POST-OP DIAGNOSIS:  Acute lateral meniscal injury of right knee 02-Apr-2021 08:43:40  Olga Cardozo

## 2021-04-16 ENCOUNTER — APPOINTMENT (OUTPATIENT)
Dept: ORTHOPEDIC SURGERY | Facility: CLINIC | Age: 51
End: 2021-04-16
Payer: COMMERCIAL

## 2021-04-16 ENCOUNTER — NON-APPOINTMENT (OUTPATIENT)
Age: 51
End: 2021-04-16

## 2021-04-16 VITALS — WEIGHT: 125 LBS | BODY MASS INDEX: 21.34 KG/M2 | HEIGHT: 64 IN

## 2021-04-16 PROCEDURE — 99024 POSTOP FOLLOW-UP VISIT: CPT

## 2021-04-16 NOTE — HISTORY OF PRESENT ILLNESS
[Doing Well] : is doing well [Excellent Pain Control] : has excellent pain control [No Sign of Infection] : is showing no signs of infection [Erythema] : not erythematous [Discharge] : absent of discharge [Dehiscence] : not dehisced [de-identified] : S/P Right knee arthroscopic partial lateral meniscectomy, DOS: 4/2/21. [de-identified] : The patient is a 50 year old female 14 days s/p Right knee arthroscopic partial lateral meniscectomy. She is ambulating and transferring well without assistive devices. She reports attending outpatient physical therapy and notes minimal pain at this time. She notes returning to normal activities. She reports her symptoms are greatly improved since surgery. Overall, she is very happy with the results of the surgery thus far.  [de-identified] : Exam of the right knee shows well healed portal site. No instability, minimal effusion, full extension, flexion of 134 degrees measured with a goniometer. 5/5 motor strength bilaterally distally. Sensation intact distally.  [de-identified] : The patient is a 50 year old female 14 days s/p Right knee arthroscopic partial lateral meniscectomy. We reviewed the images from the arthroscopy in the office today. She will continue outpatient physical therapy. We discussed the use of over-the-counter anti-inflammatories as well as activity modification and ice as needed. Overall, she is very happy with the results of the surgery thus far. Follow up in 4 weeks for repeat evaluation.

## 2021-04-16 NOTE — ADDENDUM
[FreeTextEntry1] : This note was authored by Casper Gutierrez working as a medical scribe for Dr. Rosas Blair. The note was reviewed, edited, and revised by Dr. Rosas Blair whom is in agreement with the exam findings, imaging findings, and treatment plan. 04/16/2021.

## 2021-04-29 PROBLEM — F41.9 ANXIETY DISORDER, UNSPECIFIED: Chronic | Status: ACTIVE | Noted: 2021-03-23

## 2021-05-14 ENCOUNTER — APPOINTMENT (OUTPATIENT)
Dept: ORTHOPEDIC SURGERY | Facility: CLINIC | Age: 51
End: 2021-05-14
Payer: COMMERCIAL

## 2021-05-14 VITALS
SYSTOLIC BLOOD PRESSURE: 145 MMHG | HEIGHT: 64 IN | DIASTOLIC BLOOD PRESSURE: 85 MMHG | WEIGHT: 125 LBS | HEART RATE: 92 BPM | BODY MASS INDEX: 21.34 KG/M2

## 2021-05-14 PROCEDURE — 99024 POSTOP FOLLOW-UP VISIT: CPT

## 2021-05-14 NOTE — HISTORY OF PRESENT ILLNESS
[Doing Well] : is doing well [Excellent Pain Control] : has excellent pain control [No Sign of Infection] : is showing no signs of infection [Erythema] : not erythematous [Discharge] : absent of discharge [Dehiscence] : not dehisced [de-identified] : S/P Right knee arthroscopic partial lateral meniscectomy, DOS: 4/2/21.  [de-identified] : The patient is a 50 year old female 6 weeks s/p Right knee arthroscopic partial lateral meniscectomy. She is ambulating and transferring well without assistive devices. She reports concluding physical therapy at this time. She notes her right knee feels great at this time. She has returned to daily activities. Overall, she is very happy with the results of the surgery.  [de-identified] : Exam of the right knee shows well healed portal sites. A minimal effusion, full extension, flexion of 137 degrees measured with a goniometer. 5/5 motor strength bilaterally distally. Sensation intact distally.  [de-identified] : . [de-identified] : The patient is a 50 year old female 6 weeks s/p Right knee arthroscopic partial lateral meniscectomy. She will continue home strengthening exercises. Overall, she is very happy with the results of the surgery. She may follow up as needed.

## 2021-05-14 NOTE — ADDENDUM
[FreeTextEntry1] : This note was authored by Casper Gutierrez working as a medical scribe for Dr. Rosas Blair. The note was reviewed, edited, and revised by Dr. Rosas Blair whom is in agreement with the exam findings, imaging findings, and treatment plan. 05/14/2021.

## 2021-05-20 ENCOUNTER — APPOINTMENT (OUTPATIENT)
Dept: FAMILY MEDICINE | Facility: CLINIC | Age: 51
End: 2021-05-20
Payer: COMMERCIAL

## 2021-05-20 VITALS
SYSTOLIC BLOOD PRESSURE: 145 MMHG | HEART RATE: 98 BPM | DIASTOLIC BLOOD PRESSURE: 70 MMHG | RESPIRATION RATE: 16 BRPM | HEIGHT: 64 IN | WEIGHT: 128 LBS | BODY MASS INDEX: 21.85 KG/M2 | OXYGEN SATURATION: 98 %

## 2021-05-20 VITALS — DIASTOLIC BLOOD PRESSURE: 70 MMHG | SYSTOLIC BLOOD PRESSURE: 118 MMHG

## 2021-05-20 PROCEDURE — 99214 OFFICE O/P EST MOD 30 MIN: CPT

## 2021-05-20 PROCEDURE — 99072 ADDL SUPL MATRL&STAF TM PHE: CPT

## 2021-05-24 NOTE — PLAN
[FreeTextEntry1] : FUV for 50 year old F with PMH as stated in HPI / active list. \par \par Management : \par \par See HPI and Plan\par \par Advised patient to do some therapeutic breathing and demonstrated back to me. \par \par Greatly reassured !\par \par Encouraged  to continue current healthy lifestyle behaviors.\par \par \par Best wishes offered !

## 2021-05-24 NOTE — END OF VISIT
[FreeTextEntry3] : Medical record entries made by the scribe today, were at my direction and personally dictated to them by me, Dr. Leila Santos on 05/20/2021. I have reviewed the chart and agree that the record accurately reflects my personal performance of the history, physical exam, assessment and plan.

## 2021-05-24 NOTE — HISTORY OF PRESENT ILLNESS
[FreeTextEntry1] : Blood pressure FUV \par \par Last seen in office 07/16/2020- encounter reviewed.\par \par Since has had arthroscopic partial lateral knee meniscectomy 4/2/21 and  recovering uneventfully.  [de-identified] : Patient presents today for a FUV on blood pressure.\par  Patient had a meniscus tear and had to go for surgery. \par During Post-Op, BP  was around 140/80.\par PO check 5/14 /85\par Nurses office in school was 140/90 recently.\par \par She is quite heightened anxiety with regard to this NEW findings. \par \par  Patient has been going for daily walks.  Denies adding salt in recent weeks .

## 2021-05-24 NOTE — ADDENDUM
[FreeTextEntry1] : I, Kelly Lira acting as a scribe for Dr. Leila Santos MD on 05/20/2021 at 1:38 PM.

## 2021-07-20 ENCOUNTER — APPOINTMENT (OUTPATIENT)
Dept: FAMILY MEDICINE | Facility: CLINIC | Age: 51
End: 2021-07-20
Payer: COMMERCIAL

## 2021-07-20 ENCOUNTER — LABORATORY RESULT (OUTPATIENT)
Age: 51
End: 2021-07-20

## 2021-07-20 ENCOUNTER — NON-APPOINTMENT (OUTPATIENT)
Age: 51
End: 2021-07-20

## 2021-07-20 VITALS
DIASTOLIC BLOOD PRESSURE: 74 MMHG | SYSTOLIC BLOOD PRESSURE: 122 MMHG | RESPIRATION RATE: 16 BRPM | OXYGEN SATURATION: 99 % | BODY MASS INDEX: 21.51 KG/M2 | HEIGHT: 64 IN | WEIGHT: 126 LBS | TEMPERATURE: 97.6 F | HEART RATE: 68 BPM

## 2021-07-20 DIAGNOSIS — Z98.890 OTHER SPECIFIED POSTPROCEDURAL STATES: ICD-10-CM

## 2021-07-20 DIAGNOSIS — S83.281A OTHER TEAR OF LATERAL MENISCUS, CURRENT INJURY, RIGHT KNEE, INITIAL ENCOUNTER: ICD-10-CM

## 2021-07-20 DIAGNOSIS — Z01.818 ENCOUNTER FOR OTHER PREPROCEDURAL EXAMINATION: ICD-10-CM

## 2021-07-20 PROCEDURE — 99072 ADDL SUPL MATRL&STAF TM PHE: CPT

## 2021-07-20 PROCEDURE — 36415 COLL VENOUS BLD VENIPUNCTURE: CPT

## 2021-07-20 PROCEDURE — 99396 PREV VISIT EST AGE 40-64: CPT | Mod: 25

## 2021-07-20 RX ORDER — HYDROCODONE BITARTRATE AND ACETAMINOPHEN 5; 325 MG/1; MG/1
5-325 TABLET ORAL
Qty: 20 | Refills: 0 | Status: COMPLETED | COMMUNITY
Start: 2021-04-02

## 2021-07-20 RX ORDER — AMOXICILLIN AND CLAVULANATE POTASSIUM 875; 125 MG/1; MG/1
875-125 TABLET, COATED ORAL
Qty: 20 | Refills: 0 | Status: COMPLETED | COMMUNITY
Start: 2021-05-30

## 2021-07-20 NOTE — HISTORY OF PRESENT ILLNESS
[FreeTextEntry1] : CPE \par Last seen for same in JULY 2020 \par \par Currently NOT vaccinated [de-identified] : In last year was seen in consultation with ORTHO for pain and now s/p arthroscopic repair of torn meniscus RIGHT KNEE ( MARCH 2021) \par Full recovery. \par Also seen by me in MAY in FU on BP; was elevated in ORTHO office.  NO need for medications.\par \par Recent UC for acute RIGHT JAW swelling and diagnosed with salivary gland stone. \par Treated with abxs and advised lemon drops to increase  sucking . \par \par ANXIETY DO  CHRONIC  low dose Prozac "for now" \par In review July 2020 .\par Seen for acute visit in JULY ; condition resolved.\par Telehealth visit April for refill of Prozac and encounter reviewed. Doing well! 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 \par Stressed but walks everyday.  Stopped running.  \par

## 2021-07-20 NOTE — ASSESSMENT
[FreeTextEntry1] :  Well exam for 50 year old  WF  with PMH as stated in HPI / active list. \par \par Management : \par \par \par See HPI and Plan\par \par Labs in office today.   Will advise. \par \par Best wishes offered !\par

## 2021-07-20 NOTE — HEALTH RISK ASSESSMENT
[0] : 2) Feeling down, depressed, or hopeless: Not at all (0) [Excellent] : ~his/her~  mood as  excellent [Yes] : Yes [2 - 4 times a month (2 pts)] : 2-4 times a month (2 points) [1 or 2 (0 pts)] : 1 or 2 (0 points) [Never (0 pts)] : Never (0 points) [No falls in past year] : Patient reported no falls in the past year [Patient reported mammogram was normal] : Patient reported mammogram was normal [Patient reported PAP Smear was normal] : Patient reported PAP Smear was normal [None] : None [With Family] : lives with family [# of Members in Household ___] :  household currently consist of [unfilled] member(s) [Graduate School] : graduate school [] :  [# Of Children ___] : has [unfilled] children [Feels Safe at Home] : Feels safe at home [Fully functional (bathing, dressing, toileting, transferring, walking, feeding)] : Fully functional (bathing, dressing, toileting, transferring, walking, feeding) [Fully functional (using the telephone, shopping, preparing meals, housekeeping, doing laundry, using] : Fully functional and needs no help or supervision to perform IADLs (using the telephone, shopping, preparing meals, housekeeping, doing laundry, using transportation, managing medications and managing finances) [Smoke Detector] : smoke detector [Carbon Monoxide Detector] : carbon monoxide detector [Seat Belt] :  uses seat belt [Patient reported colonoscopy was normal] : Patient reported colonoscopy was normal [Sunscreen] : uses sunscreen [] : No [de-identified] : Denies [de-identified] : GYN/ ORTHO /annual DERM  [de-identified] : WALKS  "almost every day"  [de-identified] : Healthy !  [[id number]] : 0 [Change in mental status noted] : No change in mental status noted [Reports changes in dental health] : Reports no changes in dental health [Guns at Home] : no guns at home [Travel to Developing Areas] : does not  travel to developing areas [MammogramDate] : 7/21   [MammogramComments] : self report   [place] request from [place]  [PapSmearDate] : 2019  [ColonoscopyDate] : FIT 2020    [ColonoscopyComments] : agrees to colonoscopy

## 2021-07-20 NOTE — PHYSICAL EXAM
[Normal Sclera/Conjunctiva] : normal sclera/conjunctiva [No Respiratory Distress] : no respiratory distress  [No Accessory Muscle Use] : no accessory muscle use [Normal Rate] : normal rate  [Regular Rhythm] : with a regular rhythm [No Varicosities] : no varicosities [Pedal Pulses Present] : the pedal pulses are present [No Edema] : there was no peripheral edema [No Extremity Clubbing/Cyanosis] : no extremity clubbing/cyanosis [No Joint Swelling] : no joint swelling [Grossly Normal Strength/Tone] : grossly normal strength/tone [No Focal Deficits] : no focal deficits [Normal Gait] : normal gait [Deep Tendon Reflexes (DTR)] : deep tendon reflexes were 2+ and symmetric [Alert and Oriented x3] : oriented to person, place, and time [Normal Insight/Judgement] : insight and judgment were intact [PERRL] : pupils equal round and reactive to light [Normal TMs] : both tympanic membranes were normal [Supple] : supple [Thyroid Normal, No Nodules] : the thyroid was normal and there were no nodules present [de-identified] : calm and engaging  [de-identified] : MERARY

## 2021-07-26 LAB
ALBUMIN SERPL ELPH-MCNC: 4.6 G/DL
ALP BLD-CCNC: 61 U/L
ALT SERPL-CCNC: 16 U/L
ANION GAP SERPL CALC-SCNC: 13 MMOL/L
APPEARANCE: ABNORMAL
AST SERPL-CCNC: 27 U/L
BASOPHILS # BLD AUTO: 0.03 K/UL
BASOPHILS NFR BLD AUTO: 0.7 %
BILIRUB SERPL-MCNC: 0.6 MG/DL
BILIRUBIN URINE: NEGATIVE
BLOOD URINE: NEGATIVE
BUN SERPL-MCNC: 17 MG/DL
CALCIUM SERPL-MCNC: 9.6 MG/DL
CHLORIDE SERPL-SCNC: 103 MMOL/L
CHOLEST SERPL-MCNC: 233 MG/DL
CO2 SERPL-SCNC: 24 MMOL/L
COLOR: NORMAL
CREAT SERPL-MCNC: 0.86 MG/DL
EOSINOPHIL # BLD AUTO: 0.17 K/UL
EOSINOPHIL NFR BLD AUTO: 3.8 %
ESTIMATED AVERAGE GLUCOSE: 108 MG/DL
GLUCOSE QUALITATIVE U: NEGATIVE
GLUCOSE SERPL-MCNC: 85 MG/DL
HBA1C MFR BLD HPLC: 5.4 %
HCT VFR BLD CALC: 45 %
HDLC SERPL-MCNC: 112 MG/DL
HGB BLD-MCNC: 13.8 G/DL
IMM GRANULOCYTES NFR BLD AUTO: 0 %
KETONES URINE: NEGATIVE
LDLC SERPL CALC-MCNC: 111 MG/DL
LEUKOCYTE ESTERASE URINE: NEGATIVE
LYMPHOCYTES # BLD AUTO: 1.61 K/UL
LYMPHOCYTES NFR BLD AUTO: 36.4 %
MAN DIFF?: NORMAL
MCHC RBC-ENTMCNC: 29.6 PG
MCHC RBC-ENTMCNC: 30.7 GM/DL
MCV RBC AUTO: 96.6 FL
MONOCYTES # BLD AUTO: 0.41 K/UL
MONOCYTES NFR BLD AUTO: 9.3 %
NEUTROPHILS # BLD AUTO: 2.2 K/UL
NEUTROPHILS NFR BLD AUTO: 49.8 %
NITRITE URINE: NEGATIVE
NONHDLC SERPL-MCNC: 121 MG/DL
PH URINE: 6
PLATELET # BLD AUTO: 254 K/UL
POTASSIUM SERPL-SCNC: 5 MMOL/L
PROT SERPL-MCNC: 7.2 G/DL
PROTEIN URINE: NEGATIVE
RBC # BLD: 4.66 M/UL
RBC # FLD: 13.2 %
SODIUM SERPL-SCNC: 139 MMOL/L
SPECIFIC GRAVITY URINE: 1.02
TRIGL SERPL-MCNC: 49 MG/DL
TSH SERPL-ACNC: 1.26 UIU/ML
UROBILINOGEN URINE: NORMAL
WBC # FLD AUTO: 4.42 K/UL

## 2022-03-15 ENCOUNTER — APPOINTMENT (OUTPATIENT)
Dept: GASTROENTEROLOGY | Facility: CLINIC | Age: 52
End: 2022-03-15

## 2022-07-21 ENCOUNTER — APPOINTMENT (OUTPATIENT)
Dept: FAMILY MEDICINE | Facility: CLINIC | Age: 52
End: 2022-07-21

## 2022-07-21 ENCOUNTER — NON-APPOINTMENT (OUTPATIENT)
Age: 52
End: 2022-07-21

## 2022-07-21 VITALS
WEIGHT: 127 LBS | OXYGEN SATURATION: 97 % | SYSTOLIC BLOOD PRESSURE: 126 MMHG | DIASTOLIC BLOOD PRESSURE: 74 MMHG | HEART RATE: 92 BPM | HEIGHT: 64 IN | BODY MASS INDEX: 21.68 KG/M2

## 2022-07-21 VITALS — DIASTOLIC BLOOD PRESSURE: 64 MMHG | SYSTOLIC BLOOD PRESSURE: 110 MMHG

## 2022-07-21 DIAGNOSIS — G43.909 MIGRAINE, UNSPECIFIED, NOT INTRACTABLE, W/OUT STATUS MIGRAINOSUS: ICD-10-CM

## 2022-07-21 DIAGNOSIS — F41.9 ANXIETY DISORDER, UNSPECIFIED: ICD-10-CM

## 2022-07-21 PROCEDURE — 99396 PREV VISIT EST AGE 40-64: CPT

## 2022-07-21 NOTE — PLAN
[FreeTextEntry1] : In review July 2021:\par  Well exam for 50 year old  WF  with PMH as stated in HPI / active list. \par \par Management : \par \par \par See HPI and Plan\par \par Labs in office today.   Will advise. \par \par Best wishes offered !\par

## 2022-08-03 LAB
25(OH)D3 SERPL-MCNC: 48.9 NG/ML
ALBUMIN SERPL ELPH-MCNC: 4.5 G/DL
ALP BLD-CCNC: 59 U/L
ALT SERPL-CCNC: 14 U/L
ANION GAP SERPL CALC-SCNC: 11 MMOL/L
APPEARANCE: CLEAR
AST SERPL-CCNC: 28 U/L
BASOPHILS # BLD AUTO: 0.05 K/UL
BASOPHILS NFR BLD AUTO: 0.8 %
BILIRUB SERPL-MCNC: 0.5 MG/DL
BILIRUBIN URINE: NEGATIVE
BLOOD URINE: NEGATIVE
BUN SERPL-MCNC: 16 MG/DL
CALCIUM SERPL-MCNC: 9.1 MG/DL
CHLORIDE SERPL-SCNC: 105 MMOL/L
CHOLEST SERPL-MCNC: 221 MG/DL
CO2 SERPL-SCNC: 22 MMOL/L
COLOR: YELLOW
CREAT SERPL-MCNC: 0.92 MG/DL
EGFR: 75 ML/MIN/1.73M2
EOSINOPHIL # BLD AUTO: 0.25 K/UL
EOSINOPHIL NFR BLD AUTO: 4.2 %
ESTIMATED AVERAGE GLUCOSE: 111 MG/DL
GLUCOSE QUALITATIVE U: NEGATIVE
GLUCOSE SERPL-MCNC: 70 MG/DL
HBA1C MFR BLD HPLC: 5.5 %
HCT VFR BLD CALC: 43 %
HDLC SERPL-MCNC: 114 MG/DL
HGB BLD-MCNC: 13.2 G/DL
IMM GRANULOCYTES NFR BLD AUTO: 0.2 %
KETONES URINE: NEGATIVE
LDLC SERPL CALC-MCNC: 98 MG/DL
LEUKOCYTE ESTERASE URINE: NEGATIVE
LYMPHOCYTES # BLD AUTO: 1.72 K/UL
LYMPHOCYTES NFR BLD AUTO: 29 %
MAN DIFF?: NORMAL
MCHC RBC-ENTMCNC: 29.2 PG
MCHC RBC-ENTMCNC: 30.7 GM/DL
MCV RBC AUTO: 95.1 FL
MONOCYTES # BLD AUTO: 0.58 K/UL
MONOCYTES NFR BLD AUTO: 9.8 %
NEUTROPHILS # BLD AUTO: 3.32 K/UL
NEUTROPHILS NFR BLD AUTO: 56 %
NITRITE URINE: NEGATIVE
NONHDLC SERPL-MCNC: 108 MG/DL
PH URINE: 6
PLATELET # BLD AUTO: 231 K/UL
POTASSIUM SERPL-SCNC: 4.9 MMOL/L
PROT SERPL-MCNC: 6.6 G/DL
PROTEIN URINE: NORMAL
RBC # BLD: 4.52 M/UL
RBC # FLD: 13.4 %
SODIUM SERPL-SCNC: 138 MMOL/L
SPECIFIC GRAVITY URINE: 1.03
TRIGL SERPL-MCNC: 48 MG/DL
TSH SERPL-ACNC: 2.49 UIU/ML
UROBILINOGEN URINE: NORMAL
WBC # FLD AUTO: 5.93 K/UL

## 2022-08-03 NOTE — ADDENDUM
[FreeTextEntry1] : Medical record entries made by the scribe today, were at my direction and personally dictated to them by me, Dr. Leila Santos on 07/21/2022.  I have reviewed the chart and agree that the record accurately reflects my personal performance of the history, physical exam, assessment and plan.\par \par Robbin ARREAGA acting as scribe for Dr. Leila Santos MD on 07/21/2022 at 9:52 AM.\par

## 2022-08-03 NOTE — ASSESSMENT
[FreeTextEntry1] : CPE for 51 year old WF with PMH as stated in HPI / active list. \par \par Management : \par \par See HPI and Plan.\par \par Labs in office today, will advise.\par \par Advised to try Ashwagandha re anxiety. \par \par Continue current medication regimens. Continue healthy lifestyle choices!\par \par Best wishes offered!

## 2022-08-03 NOTE — HISTORY OF PRESENT ILLNESS
[FreeTextEntry1] : CPE\par Last seen in July 2021 for same, encounter reviewed. \par \par GINA ROSADO is a 51 year old F who presents today for a CPE. Pt has no complaints, has been well, and denies any recent ER visits/hospitalizations/MVA's or MSK injuries. \par \par Pt states no complications re lateral meniscus tear Right knee in March 2021.  \par \par Pt states she continues to walk most days of the week, continues to be active and exercise regularly, 17 minute mile walking, attempts 4 mile long walks.  Pt states intermittent anxiety "that's just life." \par \par Continues teaching, planning on retiring in 4 years, desires to move to [place] when the time comes.  Going to [facility] in [place] in coming weeks for vacation. Son plans on attending college in [place].  [de-identified] : In review July 2021:\par CPE \par Last seen for same in JULY 2020 \par \par Currently NOT vaccinated\par In last year was seen in consultation with ORTHO for pain and now s/p arthroscopic repair of torn meniscus RIGHT KNEE ( MARCH 2021) \par Full recovery. \par Also seen by me in MAY in FU on BP; was elevated in ORTHO office.  NO need for medications.\par \par Recent UC for acute RIGHT JAW swelling and diagnosed with salivary gland stone. \par Treated with abxs and advised lemon drops to increase  sucking . \par \par ANXIETY DO  CHRONIC  low dose Prozac "for now" \par In review July 2020 .\par Seen for acute visit in JULY ; condition resolved.\par Telehealth visit April for refill of Prozac and encounter reviewed. Doing well! 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 \par Stressed but walks everyday.  Stopped running.  \par

## 2022-08-03 NOTE — HEALTH RISK ASSESSMENT
[Excellent] : ~his/her~  mood as  excellent [Yes] : Yes [2 - 4 times a month (2 pts)] : 2-4 times a month (2 points) [1 or 2 (0 pts)] : 1 or 2 (0 points) [Never (0 pts)] : Never (0 points) [No falls in past year] : Patient reported no falls in the past year [0] : 2) Feeling down, depressed, or hopeless: Not at all (0) [Patient reported mammogram was normal] : Patient reported mammogram was normal [Patient reported PAP Smear was normal] : Patient reported PAP Smear was normal [Patient reported colonoscopy was normal] : Patient reported colonoscopy was normal [None] : None [With Family] : lives with family [# of Members in Household ___] :  household currently consist of [unfilled] member(s) [Graduate School] : graduate school [] :  [# Of Children ___] : has [unfilled] children [Feels Safe at Home] : Feels safe at home [Fully functional (bathing, dressing, toileting, transferring, walking, feeding)] : Fully functional (bathing, dressing, toileting, transferring, walking, feeding) [Fully functional (using the telephone, shopping, preparing meals, housekeeping, doing laundry, using] : Fully functional and needs no help or supervision to perform IADLs (using the telephone, shopping, preparing meals, housekeeping, doing laundry, using transportation, managing medications and managing finances) [Smoke Detector] : smoke detector [Carbon Monoxide Detector] : carbon monoxide detector [Seat Belt] :  uses seat belt [Sunscreen] : uses sunscreen [de-identified] : Denies [de-identified] : GYN/ ORTHO /annual DERM  [de-identified] : WALKS  "almost every day"  [de-identified] : Healthy !  [[id number]] : 0 [Change in mental status noted] : No change in mental status noted [Reports changes in dental health] : Reports no changes in dental health [Guns at Home] : no guns at home [Travel to Developing Areas] : does not  travel to developing areas [MammogramDate] : 7/21   [PapSmearDate] : 2019  [ColonoscopyDate] : FIT 2020    [ColonoscopyComments] : agrees to colonoscopy

## 2022-08-03 NOTE — PHYSICAL EXAM
[Normal Sclera/Conjunctiva] : normal sclera/conjunctiva [PERRL] : pupils equal round and reactive to light [Normal TMs] : both tympanic membranes were normal [Supple] : supple [Thyroid Normal, No Nodules] : the thyroid was normal and there were no nodules present [No Respiratory Distress] : no respiratory distress  [No Accessory Muscle Use] : no accessory muscle use [Normal Rate] : normal rate  [Regular Rhythm] : with a regular rhythm [No Varicosities] : no varicosities [Pedal Pulses Present] : the pedal pulses are present [No Edema] : there was no peripheral edema [No Extremity Clubbing/Cyanosis] : no extremity clubbing/cyanosis [No Joint Swelling] : no joint swelling [Grossly Normal Strength/Tone] : grossly normal strength/tone [No Focal Deficits] : no focal deficits [Normal Gait] : normal gait [Deep Tendon Reflexes (DTR)] : deep tendon reflexes were 2+ and symmetric [Alert and Oriented x3] : oriented to person, place, and time [Normal Insight/Judgement] : insight and judgment were intact [Speech Grossly Normal] : speech grossly normal [de-identified] : calm and engaging  [de-identified] : MERARY

## 2023-05-13 ENCOUNTER — NON-APPOINTMENT (OUTPATIENT)
Age: 53
End: 2023-05-13

## 2023-05-14 ENCOUNTER — RESULT REVIEW (OUTPATIENT)
Age: 53
End: 2023-05-14

## 2023-05-18 ENCOUNTER — APPOINTMENT (OUTPATIENT)
Dept: ULTRASOUND IMAGING | Facility: CLINIC | Age: 53
End: 2023-05-18
Payer: COMMERCIAL

## 2023-05-18 ENCOUNTER — APPOINTMENT (OUTPATIENT)
Dept: FAMILY MEDICINE | Facility: CLINIC | Age: 53
End: 2023-05-18
Payer: COMMERCIAL

## 2023-05-18 ENCOUNTER — OUTPATIENT (OUTPATIENT)
Dept: OUTPATIENT SERVICES | Facility: HOSPITAL | Age: 53
LOS: 1 days | End: 2023-05-18
Payer: COMMERCIAL

## 2023-05-18 VITALS
HEIGHT: 64 IN | OXYGEN SATURATION: 99 % | HEART RATE: 96 BPM | BODY MASS INDEX: 21.68 KG/M2 | WEIGHT: 127 LBS | SYSTOLIC BLOOD PRESSURE: 136 MMHG | DIASTOLIC BLOOD PRESSURE: 86 MMHG

## 2023-05-18 DIAGNOSIS — S93.402A SPRAIN OF UNSPECIFIED LIGAMENT OF LEFT ANKLE, INITIAL ENCOUNTER: ICD-10-CM

## 2023-05-18 DIAGNOSIS — M79.662 PAIN IN LEFT LOWER LEG: ICD-10-CM

## 2023-05-18 PROCEDURE — 93971 EXTREMITY STUDY: CPT | Mod: 26

## 2023-05-18 PROCEDURE — 93971 EXTREMITY STUDY: CPT

## 2023-05-18 PROCEDURE — 99213 OFFICE O/P EST LOW 20 MIN: CPT

## 2023-05-20 PROBLEM — M79.662 PAIN OF LEFT CALF: Status: ACTIVE | Noted: 2023-05-18

## 2023-05-20 PROBLEM — S93.402A SPRAIN OF ANKLE, LEFT: Status: ACTIVE | Noted: 2023-05-20

## 2023-05-20 NOTE — PHYSICAL EXAM
[No Acute Distress] : no acute distress [Well Nourished] : well nourished [Well-Appearing] : well-appearing [No Respiratory Distress] : no respiratory distress  [No Accessory Muscle Use] : no accessory muscle use [Clear to Auscultation] : lungs were clear to auscultation bilaterally [Normal Rate] : normal rate  [Regular Rhythm] : with a regular rhythm [Normal S1, S2] : normal S1 and S2 [de-identified] : SWELLING TO LEFT ANKLE.  NO CALF TENDERNESS OR SWELLING.  DISTAL PULSES INTACT

## 2023-05-20 NOTE — PLAN
[FreeTextEntry1] : WILL SEND FOR VENOUS DUPLEX\par REST, ICE, ELEVATION, WRAP ANKLE\par AVOID ACTIVITIES CAUSING PAIN\par DISCUSSED ORTHOPEDICS IF SYMPTOMS PERSIST/WORSEN; AWARE MAY NEED ADDITIONAL IMAGING AND CONTACT INFORMATION FOR ORTHOPEDICS GIVEN\par FOLLOW-UP WITH PMD\par CALL WITH ANY QUESTIONS, CONCERNS OR CHANGES

## 2023-05-20 NOTE — HISTORY OF PRESENT ILLNESS
[FreeTextEntry8] : MS. ROSADO IS A PLEASANT 53 YO PATIENT OF DR. DELEON PRESENTING FOR ACUTE VISIT.  SHE SPRAINED HER LEFT ANKLE ON SUNDAY.  HER DOG KNOCKED HER DOWN ON ACCIDENT.  WENT TO URGENT CARE ON SUNDAY.  HAD XRAY IMAGING AND ADVISED NO FRACTURE.  ICING, WRAPPING AND IS GETTING BETTER.  THIS MORNING NOTICED A CRAMP IN LEFT CALF.  HAS NOT NOTICED ANY SWELLING NOW BUT DID ON MONDAY.  SWELLING GOING DOWN IN ANKLE.  WALKING ON IT OKAY AND LESS PAIN.  DAD HAD BLOOD CLOT IN LEG AND SHE IS CONCERNED BECAUSE OF THAT.

## 2023-06-05 ENCOUNTER — APPOINTMENT (OUTPATIENT)
Dept: ORTHOPEDIC SURGERY | Facility: CLINIC | Age: 53
End: 2023-06-05
Payer: COMMERCIAL

## 2023-06-05 VITALS
HEIGHT: 64 IN | WEIGHT: 127 LBS | BODY MASS INDEX: 21.68 KG/M2 | DIASTOLIC BLOOD PRESSURE: 87 MMHG | SYSTOLIC BLOOD PRESSURE: 137 MMHG | HEART RATE: 85 BPM

## 2023-06-05 DIAGNOSIS — S93.402A SPRAIN OF UNSPECIFIED LIGAMENT OF LEFT ANKLE, INITIAL ENCOUNTER: ICD-10-CM

## 2023-06-05 PROCEDURE — 99213 OFFICE O/P EST LOW 20 MIN: CPT

## 2023-06-05 PROCEDURE — 73610 X-RAY EXAM OF ANKLE: CPT | Mod: LT

## 2023-06-05 NOTE — PHYSICAL EXAM
[de-identified] : General:\par Awake, alert, no acute distress, Patient was cooperative and appropriate during the examination.\par \par The patient is of normal weight for height and age.\par \par Walks without an antalgic gait.\par \par Full, painless range of motion of the neck and back.\par \par Exam of the bilateral lower extremities is intact and symmetric with regards to dermatologic, vascular, and neurologic exam. Bilateral lower extremity sensation is grossly intact to light touch in the DP/SP/T/S/S nerve distributions. Intact DF/PF/EHL. BIlateral lower extremities warm and well-perfused with brisk capillary refill.\par \par \par Pulmonary:\par Regular, nonlabored breathing\par \par Abdomen:\par Soft, nontender, nondistended.\par \par Lymphatic:\par No evidence of axillary lymphadenopathy\par \par Left ankle Examination:\par Physical examination of the ankle is negative for any abrasions or ulcerations. There is moderate soft-tissue swelling about the lateral aspect of the ankle about the level of the syndesmosis with no ecchymosis noted.\par  \par Sensation is intact to light touch L2-S1\par Palpable DP/PT pulse\par EHL/FHL/TA/GSC motor function intact\par  \par Range of Motion:\par Dorsiflexion 20 degrees\par Plantarflexion 45 degrees\par Inversion 30 degrees\par Eversion 20 degrees\par  \par Strength Testing\par Dorsiflexion 5/5\par Plantarflexion 5/5\par Inversion 5/5\par Eversion 5/5\par  \par Palpation\par Not tender to palpation over the lateral malleolus\par Not tender to palpation over the medial malleolus\par Moderately tender to palpation over the ATFL and CFL\par Mildly tender to palpation about the AITFL\par Not tender to palpation over the PTFL\par Not tender to palpation over the calcaneal tuberosity\par Not tender to palpation over the peroneal tendons\par Not tender to palpation over the tarsals, metatarsals, or phalanges\par No palpable defect within the achilles tendon\par  \par Special Tests:\par Ankle anterior drawer positive for pain, no instability\par Squeeze test positive for very mild pain at the ankle\par Moyer's test negative [de-identified] : X-rays 3 views of the left ankle taken in the office today on 6/5/2023 reveals no acute fracture or dislocation the ankle mortise well-maintained.

## 2023-06-05 NOTE — DISCUSSION/SUMMARY
[de-identified] : Assessment: Patient is a 52-year-old female with left ankle sprain\par \par Plan: I had a long discussion with the patient today regarding the nature of their diagnosis and treatment plan. We discussed the risks and benefits of no treatment as well as nonoperative and operative treatments.  I reviewed the x-rays today that showed no acute bony pathology.  On examination today she has tenderness over the lateral aspect of the ankle consistent with a sprain.  This time I am recommending conservative treatment with icing, heating, resting, bracing, physical therapy for symptomatic relief.  GI precautions were discussed.  She was given a prescription for physical therapy today and an ASO brace.  Bracing instructions were provided to the patient.  She will follow-up in 6 to 8 weeks for repeat evaluation.  If symptoms were to persist despite conservative treatment we did discuss potential MRI.  Patient seen and examined with Dr. Patino today.\par  \par The patient verbalizes their understanding and agrees with the plan.  All questions were answered to their satisfaction.

## 2023-06-05 NOTE — HISTORY OF PRESENT ILLNESS
[Worsening] : worsening [Intermit.] : ~He/She~ states the symptoms seem to be intermittent [Direct Pressure] : worsened by direct pressure [Walking] : worsened by walking [Rest] : relieved by rest [de-identified] : DOI: 05/21/2023\par \par 06/05/2023 : GINA ROSADO  is a 52 year  old female who presents to the office for evaluation of her left ankle.  She states that since the above-mentioned date on 4/21/2023 she has had pain in her ankle after she rolled it when tripping with her dog.  She states that since then she has been icing, resting and the pain and swelling has improved however she still has some discomfort and pain and swelling in the lateral aspect of the ankle.  She states is worse with ambulation and alleviated with rest.  She states the pain is worse at the terminal degrees of plantarflexion.  She was not having total resolution of her symptoms so she wanted to be seen by a specialist.  She saw an urgent care and her primary care previously that recommended ice and medications which she has been undergoing.  She denies any numbness or tingling distally.  She has no other complaints today.  She is a schoolteacher and teaches sixth grade.

## 2023-07-24 ENCOUNTER — APPOINTMENT (OUTPATIENT)
Dept: FAMILY MEDICINE | Facility: CLINIC | Age: 53
End: 2023-07-24

## 2023-09-27 ENCOUNTER — NON-APPOINTMENT (OUTPATIENT)
Age: 53
End: 2023-09-27

## 2023-09-27 ENCOUNTER — LABORATORY RESULT (OUTPATIENT)
Age: 53
End: 2023-09-27

## 2023-09-27 ENCOUNTER — APPOINTMENT (OUTPATIENT)
Dept: FAMILY MEDICINE | Facility: CLINIC | Age: 53
End: 2023-09-27
Payer: COMMERCIAL

## 2023-09-27 VITALS
BODY MASS INDEX: 22.3 KG/M2 | OXYGEN SATURATION: 98 % | HEART RATE: 84 BPM | WEIGHT: 130.6 LBS | TEMPERATURE: 98 F | HEIGHT: 64 IN | DIASTOLIC BLOOD PRESSURE: 78 MMHG | SYSTOLIC BLOOD PRESSURE: 128 MMHG | RESPIRATION RATE: 16 BRPM

## 2023-09-27 DIAGNOSIS — R03.0 ELEVATED BLOOD-PRESSURE READING, W/OUT DIAGNOSIS OF HYPERTENSION: ICD-10-CM

## 2023-09-27 DIAGNOSIS — Z00.00 ENCOUNTER FOR GENERAL ADULT MEDICAL EXAMINATION W/OUT ABNORMAL FINDINGS: ICD-10-CM

## 2023-09-27 LAB — NONINV COLON CA DNA+OCC BLD SCRN STL QL: NORMAL

## 2023-09-27 PROCEDURE — 36415 COLL VENOUS BLD VENIPUNCTURE: CPT

## 2023-09-27 PROCEDURE — 99396 PREV VISIT EST AGE 40-64: CPT | Mod: 25

## 2023-09-27 RX ORDER — PRAMOXINE HYDROCHLORIDE HYDROCORTISONE ACETATE 100; 100 MG/10G; MG/10G
1-1 AEROSOL, FOAM TOPICAL
Qty: 1 | Refills: 1 | Status: DISCONTINUED | COMMUNITY
Start: 2020-07-16 | End: 2023-09-27

## 2023-10-16 LAB
ALBUMIN SERPL ELPH-MCNC: 4.3 G/DL
ALP BLD-CCNC: 63 U/L
ALT SERPL-CCNC: 19 U/L
ANION GAP SERPL CALC-SCNC: 10 MMOL/L
APPEARANCE: CLEAR
AST SERPL-CCNC: 27 U/L
BASOPHILS # BLD AUTO: 0.07 K/UL
BASOPHILS NFR BLD AUTO: 1.6 %
BILIRUB SERPL-MCNC: 0.4 MG/DL
BILIRUBIN URINE: NEGATIVE
BLOOD URINE: ABNORMAL
BUN SERPL-MCNC: 17 MG/DL
CALCIUM SERPL-MCNC: 9.4 MG/DL
CHLORIDE SERPL-SCNC: 103 MMOL/L
CHOLEST SERPL-MCNC: 242 MG/DL
CO2 SERPL-SCNC: 26 MMOL/L
COLOR: YELLOW
CREAT SERPL-MCNC: 0.79 MG/DL
EGFR: 89 ML/MIN/1.73M2
EOSINOPHIL # BLD AUTO: 0.31 K/UL
EOSINOPHIL NFR BLD AUTO: 7 %
ESTIMATED AVERAGE GLUCOSE: 111 MG/DL
GLUCOSE QUALITATIVE U: NEGATIVE MG/DL
GLUCOSE SERPL-MCNC: 77 MG/DL
HBA1C MFR BLD HPLC: 5.5 %
HCT VFR BLD CALC: 43.4 %
HDLC SERPL-MCNC: 108 MG/DL
HGB BLD-MCNC: 14 G/DL
IMM GRANULOCYTES NFR BLD AUTO: 0.4 %
KETONES URINE: NEGATIVE MG/DL
LDLC SERPL CALC-MCNC: 124 MG/DL
LEUKOCYTE ESTERASE URINE: ABNORMAL
LYMPHOCYTES # BLD AUTO: 1.59 K/UL
LYMPHOCYTES NFR BLD AUTO: 35.7 %
MAN DIFF?: NORMAL
MCHC RBC-ENTMCNC: 30.3 PG
MCHC RBC-ENTMCNC: 32.3 GM/DL
MCV RBC AUTO: 93.9 FL
MONOCYTES # BLD AUTO: 0.62 K/UL
MONOCYTES NFR BLD AUTO: 13.9 %
NEUTROPHILS # BLD AUTO: 1.84 K/UL
NEUTROPHILS NFR BLD AUTO: 41.4 %
NITRITE URINE: NEGATIVE
NONHDLC SERPL-MCNC: 134 MG/DL
PH URINE: 7
PLATELET # BLD AUTO: 240 K/UL
POTASSIUM SERPL-SCNC: 5 MMOL/L
PROT SERPL-MCNC: 7.1 G/DL
PROTEIN URINE: NEGATIVE MG/DL
RBC # BLD: 4.62 M/UL
RBC # FLD: 12.6 %
SODIUM SERPL-SCNC: 139 MMOL/L
SPECIFIC GRAVITY URINE: 1.02
TRIGL SERPL-MCNC: 60 MG/DL
TSH SERPL-ACNC: 2.47 UIU/ML
UROBILINOGEN URINE: 0.2 MG/DL
WBC # FLD AUTO: 4.45 K/UL

## 2023-11-14 ENCOUNTER — APPOINTMENT (OUTPATIENT)
Dept: FAMILY MEDICINE | Facility: CLINIC | Age: 53
End: 2023-11-14

## 2023-12-19 ENCOUNTER — APPOINTMENT (OUTPATIENT)
Dept: ORTHOPEDIC SURGERY | Facility: CLINIC | Age: 53
End: 2023-12-19
Payer: COMMERCIAL

## 2023-12-19 VITALS
BODY MASS INDEX: 22.2 KG/M2 | WEIGHT: 130 LBS | SYSTOLIC BLOOD PRESSURE: 127 MMHG | DIASTOLIC BLOOD PRESSURE: 85 MMHG | HEIGHT: 64 IN

## 2023-12-19 DIAGNOSIS — M25.561 PAIN IN RIGHT KNEE: ICD-10-CM

## 2023-12-19 PROCEDURE — 99214 OFFICE O/P EST MOD 30 MIN: CPT

## 2023-12-19 PROCEDURE — 73564 X-RAY EXAM KNEE 4 OR MORE: CPT | Mod: RT

## 2023-12-19 NOTE — REASON FOR VISIT
[Follow-Up Visit] : a follow-up visit for [Other: ____] : [unfilled] [FreeTextEntry2] : S/P Right knee arthroscopic partial lateral meniscectomy, DOS: 4/2/21.

## 2023-12-19 NOTE — PHYSICAL EXAM
[de-identified] : The patient appears well nourished and in no apparent distress.  The patient is alert and oriented to person, place, and time.   Affect and mood appear normal. The head is normocephalic and atraumatic.  The eyes reveal normal sclera and extra ocular muscles are intact. The tongue is midline with no apparent lesions.  Skin shows normal turgor with no evidence of eczema or psoriasis.  No respiratory distress noted.  Sensation grossly intact.		  [de-identified] : Exam of the right knee shows well healed portal sites, full extension. There is no apprehension. There is lateral patella tracking. There is lateral joint line tenderness.  5/5 motor strength bilaterally distally. Sensation intact distally.		  [de-identified] : X-ray: 4 views of the right knee demonstrate well preserved joint spaces.

## 2023-12-19 NOTE — HISTORY OF PRESENT ILLNESS
[de-identified] : GINA ROSADO is a 53 year old female who presents after a few episodes of right knee pain. She is s/p arthroscopy in the knee from 4/2/2021 for a medial meniscus tear. She states she feels instances where it feels like "her knee dislocated." She feels pain with these episodes laterally. She has not had an MRI since her surgery. She presents today for evaluation of her right knee.

## 2023-12-19 NOTE — ADDENDUM
[FreeTextEntry1] : This note was authored by Pablo Venegas working as a medical scribe for Dr. Rosas Blair. The note was reviewed, edited, and revised by Dr. Rosas Blair whom is in agreement with the exam findings, imaging findings, and treatment plan. 12/19/2023

## 2023-12-19 NOTE — DISCUSSION/SUMMARY
[de-identified] : GINA ROSADO is a 53 year old female who presents with right knee pain and exam findings concerning for a lateral meniscus tear. An MRI of the patient's right knee was ordered to evaluate for lateral meniscus tear. The patient will follow up after imaging is completed to review the results in the office.

## 2023-12-27 ENCOUNTER — APPOINTMENT (OUTPATIENT)
Dept: MRI IMAGING | Facility: CLINIC | Age: 53
End: 2023-12-27
Payer: COMMERCIAL

## 2023-12-27 ENCOUNTER — OUTPATIENT (OUTPATIENT)
Dept: OUTPATIENT SERVICES | Facility: HOSPITAL | Age: 53
LOS: 1 days | End: 2023-12-27

## 2023-12-27 DIAGNOSIS — Z00.8 ENCOUNTER FOR OTHER GENERAL EXAMINATION: ICD-10-CM

## 2023-12-27 PROCEDURE — 73721 MRI JNT OF LWR EXTRE W/O DYE: CPT | Mod: 26,RT

## 2023-12-28 ENCOUNTER — APPOINTMENT (OUTPATIENT)
Dept: GASTROENTEROLOGY | Facility: CLINIC | Age: 53
End: 2023-12-28
Payer: COMMERCIAL

## 2023-12-28 VITALS
BODY MASS INDEX: 22.2 KG/M2 | WEIGHT: 130 LBS | HEART RATE: 70 BPM | RESPIRATION RATE: 16 BRPM | DIASTOLIC BLOOD PRESSURE: 80 MMHG | HEIGHT: 64 IN | SYSTOLIC BLOOD PRESSURE: 125 MMHG | OXYGEN SATURATION: 98 %

## 2023-12-28 DIAGNOSIS — Z12.11 ENCOUNTER FOR SCREENING FOR MALIGNANT NEOPLASM OF COLON: ICD-10-CM

## 2023-12-28 PROCEDURE — 99203 OFFICE O/P NEW LOW 30 MIN: CPT

## 2023-12-28 RX ORDER — SODIUM SULFATE, POTASSIUM SULFATE AND MAGNESIUM SULFATE 1.6; 3.13; 17.5 G/177ML; G/177ML; G/177ML
17.5-3.13-1.6 SOLUTION ORAL
Qty: 1 | Refills: 0 | Status: ACTIVE | COMMUNITY
Start: 2023-12-28 | End: 1900-01-01

## 2023-12-28 NOTE — ASSESSMENT
[FreeTextEntry1] : GINA ROSADO is a 53 year old female, with a PMH of Anxiety, who presents today for colon cancer screening. No previous colonoscopy. Denies family history of colon cancer or colon polyps.   Colonoscopy to be scheduled. I have discussed the indications, alternatives, benefits and potential risk including, but not limited to, bleeding, perforation, missed lesions and anesthesia complications. Bowel prep instructions discussed at length. All questions were answered. Pt is medically optimized for colonoscopy. Labs reviewed. Suprep sent to pharmacy.

## 2023-12-28 NOTE — PHYSICAL EXAM
[Alert] : alert [Normal Voice/Communication] : normal voice/communication [Healthy Appearing] : healthy appearing [No Acute Distress] : no acute distress [Sclera] : the sclera and conjunctiva were normal [Hearing Threshold Finger Rub Not [place]] : hearing was normal [Normal Lips/Gums] : the lips and gums were normal [Oropharynx] : the oropharynx was normal [Normal Appearance] : the appearance of the neck was normal [No Neck Mass] : no neck mass was observed [No Respiratory Distress] : no respiratory distress [No Acc Muscle Use] : no accessory muscle use [Respiration, Rhythm And Depth] : normal respiratory rhythm and effort [Auscultation Breath Sounds / Voice Sounds] : lungs were clear to auscultation bilaterally [Heart Rate And Rhythm] : heart rate was normal and rhythm regular [Normal S1, S2] : normal S1 and S2 [Murmurs] : no murmurs [Bowel Sounds] : normal bowel sounds [Abdomen Tenderness] : non-tender [No Masses] : no abdominal mass palpated [Abdomen Soft] : soft [] : no hepatosplenomegaly [Oriented To Time, Place, And Person] : oriented to person, place, and time

## 2023-12-28 NOTE — HISTORY OF PRESENT ILLNESS
[FreeTextEntry1] : GINA ROSADO is a 53 year old female, with a PMH of Anxiety, who presents today for colon cancer screening. No previous colonoscopy. Denies family history of colon cancer or colon polyps. She has 1 BM/day. Denies abdominal pain, constipation, diarrhea, rectal bleeding, melena, black stools, unintentional weight loss, nausea, vomiting, GERD, or dysphagia. Recent labs unremarkable.   Denies any significant pulmonary or cardiac conditions.

## 2024-02-13 ENCOUNTER — APPOINTMENT (OUTPATIENT)
Dept: GASTROENTEROLOGY | Facility: GI CENTER | Age: 54
End: 2024-02-13

## 2024-05-30 ENCOUNTER — EMERGENCY (EMERGENCY)
Facility: HOSPITAL | Age: 54
LOS: 1 days | Discharge: HOME | End: 2024-05-30
Attending: EMERGENCY MEDICINE
Payer: COMMERCIAL

## 2024-05-30 VITALS
HEART RATE: 91 BPM | RESPIRATION RATE: 16 BRPM | HEIGHT: 63 IN | TEMPERATURE: 99 F | OXYGEN SATURATION: 97 % | DIASTOLIC BLOOD PRESSURE: 101 MMHG | WEIGHT: 133.6 LBS | SYSTOLIC BLOOD PRESSURE: 160 MMHG

## 2024-05-30 PROCEDURE — 99284 EMERGENCY DEPT VISIT MOD MDM: CPT

## 2024-05-30 PROCEDURE — 73562 X-RAY EXAM OF KNEE 3: CPT

## 2024-05-30 PROCEDURE — 99283 EMERGENCY DEPT VISIT LOW MDM: CPT

## 2024-05-30 PROCEDURE — 73562 X-RAY EXAM OF KNEE 3: CPT | Mod: 26,LT

## 2024-05-30 RX ORDER — IBUPROFEN 200 MG
1 TABLET ORAL
Qty: 28 | Refills: 0
Start: 2024-05-30 | End: 2024-06-05

## 2024-05-30 RX ORDER — IBUPROFEN 200 MG
600 TABLET ORAL ONCE
Refills: 0 | Status: COMPLETED | OUTPATIENT
Start: 2024-05-30 | End: 2024-05-30

## 2024-05-30 RX ORDER — ACETAMINOPHEN 500 MG
650 TABLET ORAL ONCE
Refills: 0 | Status: COMPLETED | OUTPATIENT
Start: 2024-05-30 | End: 2024-05-30

## 2024-05-30 RX ADMIN — Medication 600 MILLIGRAM(S): at 22:28

## 2024-05-30 RX ADMIN — Medication 650 MILLIGRAM(S): at 22:28

## 2024-05-30 NOTE — ED ADULT TRIAGE NOTE - CHIEF COMPLAINT QUOTE
pt arrived with left knee pain states she was doing a tuck jump and felt her knee crack. pt currently endorses left knee pain

## 2024-05-30 NOTE — ED PROVIDER NOTE - PATIENT PORTAL LINK FT
You can access the FollowMyHealth Patient Portal offered by [facility] by registering at the following website: [email or web address]. By joining [email or web address]’s FollowMyHealth portal, you will also be able to view your health information using other applications (apps) compatible with our system.

## 2024-05-30 NOTE — ED PROVIDER NOTE - NSFOLLOWUPINSTRUCTIONS_ED_ALL_ED_FT
- Follow up with orthopedics     Sprain    A sprain is a stretch or tear in one of the tough, fiber-like tissues (ligaments) in your body. This is caused by an injury to the area such as a twisting mechanism. Symptoms include pain, swelling, or bruising. Rest that area over the next several days and slowly resume activity when tolerated. Ice can help with swelling and pain.     SEEK IMMEDIATE MEDICAL CARE IF YOU HAVE ANY OF THE FOLLOWING SYMPTOMS: worsening pain, inability to move that body part, numbness or tingling.

## 2024-05-30 NOTE — ED ADULT NURSE NOTE - OBJECTIVE STATEMENT
Pt A&Ox4. Came in w/ c/o left knee pain after doing jump tucks at gym landing a hearing a crack. Pt endorses left knee pain with limited ROM. Denies numbness, weakness, tingling, headache, fevers, swelling. VS stable, RR even and unlabored, safety maintained.

## 2024-05-30 NOTE — ED PROVIDER NOTE - CARE PROVIDER_API CALL
Amanuel Rangel  Orthopaedic Surgery  [address] [place] [id number]  Phone: [phone]  Fax: [phone]  Follow Up Time:

## 2024-05-30 NOTE — ED PROVIDER NOTE - PHYSICAL EXAMINATION
Gen: No acute distress, non toxic  HEENT: Mucous membranes moist, pink conjunctivae, EOMI. PERRL. Airway patent.   CV: RRR, nl s1/s2.  Resp: CTAB, normal rate and effort. No wheezes, rhonchi, or crackles.   GI: Abdomen soft, NT, ND. No rebound, no guarding  Neuro: A&O x4, MAEx4. 5/5 str ext x 4. Sensation intact, symmetric throughout. No FND's. Gait intact. CN 2-12 intact.   MSK: TTP inferior anterior left knee with mild swelling. No effusion. FROM passive/active. No visualized or palpable deformities.  Skin: No rashes, skin intact and well perfused. Cap refill <2sec  Vascular: posterior tibialis and dorsalis pedal pulses 2+ b/l

## 2024-05-30 NOTE — ED PROVIDER NOTE - CLINICAL SUMMARY MEDICAL DECISION MAKING FREE TEXT BOX
53 year old female no significant PMHx presents to ED for left knee pain. Pt states at 1630 today while exercising she was doing a tuck jump, when landing left left knee crack. Pt reports she let herself to ground, was able to ambulate with limp after incident. Pt endorses increased pain, stiffness. +pain with ROM. +swelling. Denies numbness, tingling, coldness, weakness, fever, chills, SOB, CP, abd pain. No medications taken prior to arrival.   Xray, Meds, likely orthopedics follow up

## 2024-05-30 NOTE — ED ADULT NURSE NOTE - NSFALLRISKINTERV_ED_ALL_ED
Assistance OOB with selected safe patient handling equipment if applicable/Assistance with ambulation/Communicate fall risk and risk factors to all staff, patient, and family/Monitor gait and stability/Provide visual cue: yellow wristband, yellow gown, etc/Reinforce activity limits and safety measures with patient and family/Call bell, personal items and telephone in reach/Instruct patient to call for assistance before getting out of bed/chair/stretcher/Non-slip footwear applied when patient is off stretcher/[place] to call system/Physically safe environment - no spills, clutter or unnecessary equipment/Purposeful Proactive Rounding/Room/bathroom lighting operational, light cord in reach

## 2024-05-30 NOTE — ED PROVIDER NOTE - ATTENDING APP SHARED VISIT CONTRIBUTION OF CARE
I, Hilario Philip MD, performed the initial face to face bedside interview with this patient regarding history of present illness, review of symptoms and relevant past medical, social and family history.  I completed an independent physical examination.  I was the initial provider who evaluated this patient. I have signed out the follow up of any pending tests (i.e. labs, radiological studies) to the ACP.  I have communicated the patient’s plan of care and disposition with the ACP.

## 2024-05-30 NOTE — ED PROVIDER NOTE - OBJECTIVE STATEMENT
53 year old female no significant PMHx presents to ED for left knee pain. Pt states at 1630 today while exercising she was doing a tuck jump, when landing left left knee crack. Pt reports she let herself to ground, was able to ambulate with limp after incident. Pt endorses increased pain, stiffness. +pain with ROM. +swelling. Denies numbness, tingling, coldness, weakness, fever, chills, SOB, CP, abd pain. No medications taken prior to arrival.

## 2024-05-31 ENCOUNTER — APPOINTMENT (OUTPATIENT)
Dept: ORTHOPEDIC SURGERY | Facility: CLINIC | Age: 54
End: 2024-05-31
Payer: COMMERCIAL

## 2024-05-31 VITALS
DIASTOLIC BLOOD PRESSURE: 91 MMHG | WEIGHT: 130 LBS | SYSTOLIC BLOOD PRESSURE: 142 MMHG | HEIGHT: 64 IN | HEART RATE: 87 BPM | BODY MASS INDEX: 22.2 KG/M2

## 2024-05-31 VITALS — BODY MASS INDEX: 22.2 KG/M2 | HEIGHT: 64 IN | HEART RATE: 70 BPM | WEIGHT: 130 LBS

## 2024-05-31 PROCEDURE — 99213 OFFICE O/P EST LOW 20 MIN: CPT

## 2024-05-31 NOTE — HISTORY OF PRESENT ILLNESS
[de-identified] : DOI: 05/30/2024 05/31/2024 : GINA ROSADO  is a 53 year  old female who presents to the office for evaluation of her left knee.  She states she was doing tuck jumps in her boot camp class at the gym yesterday and felt a loud crunching sensation and since then has had difficulty walking because of pain in the anterior aspect of the knee.  She states the knee pain is poorly localized but she is unable to bend knee very well or walk very well because of pain in the knee.  She went to the ER and had x-rays taken that showed no fracture and she is here for orthopedic follow-up today.  She denies any numbness or tingling distally.  She has no other complaints today.  She states he never had any pain in this knee before the injury.

## 2024-05-31 NOTE — DISCUSSION/SUMMARY
[de-identified] : Assessment: Patient a 53-year-old female with left knee likely patella subluxation versus strain  Plan: I had a long discussion with the patient today regarding the nature of their diagnosis and treatment plan. We discussed the risks and benefits of no treatment as well as nonoperative and operative treatments.  I reviewed the x-rays today that showed no acute bony pathology.  On examination today the patient does have an effusion as well as some laxity with Lachman testing concerning for possible ACL injury.  She is currently unable to bear weight on the lower extremity.  At this time I recommend an MRI to rule out any intra-articular pathology such as a ligamentous or meniscal injury.  She will continue to manage her self symptomatically on her own at home.  Recommend follow-up at the MRI to discuss results and any further recommendations.  Should the patient have any significant intra-articular injury surgical invention may indicate   The patient verbalizes their understanding and agrees with the plan.  All questions were answered to their satisfaction.

## 2024-05-31 NOTE — PHYSICAL EXAM
[de-identified] : General: Awake, alert, no acute distress, Patient was cooperative and appropriate during the examination.  The patient is of normal weight for height and age.  Walks without an antalgic gait.  Left knee Examination: Physical examination of the knee demonstrates normal skin without signs of skin changes or abnormalities. No erythema, warmth.  Mild to moderate effusion   Sensation is intact to light touch L2-S1 Palpable DP/PT pulse EHL/FHL/TA/GSC motor function intact   Range of Motion 0- 95 degrees   Strength Testing Quadriceps/Hamstring 5/5 Patient is able to perform a straight leg raise without difficulty.   Palpation Not tender to palpation about the distal femur, proximal tibia, or patella No palpable defect appreciated in the quadriceps or patellar tendons with tenderness over the distal patella tendon noted. Not tender to palpation of medial joint line Not tender to palpation of lateral joint line   Special Tests Anterior Drawer negative Posterior Drawer negative Lachman Exam increased laxity compared to contralateral side No Varus or Valgus Laxity at 0 or 30 degrees of knee flexion Dheeraj's Test negative for pain or crepitus Active compression of the patella negative for pain or crepitus Translation of the patella 2 quadrants with a firm endpoint [de-identified] : X-rays 3 views of the left knee taken at [facility] on 5/30/2024 shows no acute fracture or dislocation.

## 2024-06-08 ENCOUNTER — OUTPATIENT (OUTPATIENT)
Dept: OUTPATIENT SERVICES | Facility: HOSPITAL | Age: 54
LOS: 1 days | End: 2024-06-08

## 2024-06-08 ENCOUNTER — APPOINTMENT (OUTPATIENT)
Dept: MRI IMAGING | Facility: CLINIC | Age: 54
End: 2024-06-08
Payer: COMMERCIAL

## 2024-06-08 DIAGNOSIS — Z00.8 ENCOUNTER FOR OTHER GENERAL EXAMINATION: ICD-10-CM

## 2024-06-08 PROCEDURE — 73721 MRI JNT OF LWR EXTRE W/O DYE: CPT | Mod: 26,LT

## 2024-06-11 ENCOUNTER — APPOINTMENT (OUTPATIENT)
Dept: ORTHOPEDIC SURGERY | Facility: CLINIC | Age: 54
End: 2024-06-11
Payer: COMMERCIAL

## 2024-06-11 VITALS — BODY MASS INDEX: 22.2 KG/M2 | HEIGHT: 64 IN | WEIGHT: 130 LBS

## 2024-06-11 DIAGNOSIS — S83.282D OTHER TEAR OF LATERAL MENISCUS, CURRENT INJURY, LEFT KNEE, SUBSEQUENT ENCOUNTER: ICD-10-CM

## 2024-06-11 DIAGNOSIS — M25.562 PAIN IN LEFT KNEE: ICD-10-CM

## 2024-06-11 PROCEDURE — 99213 OFFICE O/P EST LOW 20 MIN: CPT

## 2024-09-30 ENCOUNTER — APPOINTMENT (OUTPATIENT)
Dept: FAMILY MEDICINE | Facility: CLINIC | Age: 54
End: 2024-09-30

## 2024-09-30 ENCOUNTER — APPOINTMENT (OUTPATIENT)
Dept: INTERNAL MEDICINE | Facility: CLINIC | Age: 54
End: 2024-09-30
Payer: COMMERCIAL

## 2024-09-30 ENCOUNTER — NON-APPOINTMENT (OUTPATIENT)
Age: 54
End: 2024-09-30

## 2024-09-30 VITALS
DIASTOLIC BLOOD PRESSURE: 80 MMHG | HEIGHT: 64 IN | BODY MASS INDEX: 22.53 KG/M2 | WEIGHT: 132 LBS | OXYGEN SATURATION: 98 % | HEART RATE: 78 BPM | SYSTOLIC BLOOD PRESSURE: 140 MMHG

## 2024-09-30 DIAGNOSIS — Z80.8 FAMILY HISTORY OF MALIGNANT NEOPLASM OF OTHER ORGANS OR SYSTEMS: ICD-10-CM

## 2024-09-30 DIAGNOSIS — F41.9 ANXIETY DISORDER, UNSPECIFIED: ICD-10-CM

## 2024-09-30 DIAGNOSIS — R03.0 ELEVATED BLOOD-PRESSURE READING, W/OUT DIAGNOSIS OF HYPERTENSION: ICD-10-CM

## 2024-09-30 DIAGNOSIS — Z82.49 FAMILY HISTORY OF ISCHEMIC HEART DISEASE AND OTHER DISEASES OF THE CIRCULATORY SYSTEM: ICD-10-CM

## 2024-09-30 DIAGNOSIS — Z00.00 ENCOUNTER FOR GENERAL ADULT MEDICAL EXAMINATION W/OUT ABNORMAL FINDINGS: ICD-10-CM

## 2024-09-30 PROCEDURE — 93000 ELECTROCARDIOGRAM COMPLETE: CPT

## 2024-09-30 PROCEDURE — 81002 URINALYSIS NONAUTO W/O SCOPE: CPT

## 2024-09-30 PROCEDURE — 36415 COLL VENOUS BLD VENIPUNCTURE: CPT

## 2024-09-30 PROCEDURE — 99396 PREV VISIT EST AGE 40-64: CPT

## 2024-09-30 NOTE — HISTORY OF PRESENT ILLNESS
[FreeTextEntry1] : CPE [de-identified] : Patient is a 53-year-old female with PMH anxiety presenting for a CPE. Patient reports she is feeling well. She is working as a  and endorses stress at work due to the large size of her class. She will be retiring in 2 years. She states she exercises daily for stress relief. She recently joined a spin class and has been coming in 1st in the competition. She also endorses inconsistent menstrual periods. She follows at [facility]. She gets regular mammograms. She reports her anxiety is under control with fluoxetine as she's been on it for about 15 years. She had a CT calcium score in the last two years that was a 0.  Last mammo- 08/24 (normal) Last pap- 08/24 (negative) Last colonoscopy- 07/24 (polyp, f/u 2029) Last eye exam- 2023 Last skin cancer screening- 04/24, has appt in October. No hx suspicious lesions

## 2024-09-30 NOTE — PLAN
[FreeTextEntry1] : Anxiety: - Tolerating fluoxetine well with no side effects - Patient states she does not need renewal at this time. Advised patient to call office when running low.  Inconsistent menstrual periods: - Discussed increased lifelong exposure to estrogen due to late menopause - Recommend strictly following 1-year guidelines for mammograms - Continue f/u with GYN  Elevated BP: - BP slightly elevated today - Patient reports a hx white coat HTN. States she cannot check her BP at home because she will obsess about it - Recommend low sodium diet and continued exercise to reduce BP - If still elevated at next visit, will discuss antihypertensives vs. checking BP at home  HCM: - Continue daily exercise with well-rounded, low fat, low sodium diet - Labs/urine collected and sent at today's visit. - Hep C screening offered and accepted - EKG NSR with incomplete bundle branch block, unchanged from prior - Next mammo- 08/25 - Next pap- 08/25 - Next colonoscopy- 07/29 - Advised patient to make appt for eye exam  - Next skin cancer screening- 10/24

## 2024-09-30 NOTE — HEALTH RISK ASSESSMENT
[Good] : ~his/her~  mood as  good [Employed] : employed [Yes] : Yes [Monthly or less (1 pt)] : Monthly or less (1 point) [1 or 2 (0 pts)] : 1 or 2 (0 points) [Never (0 pts)] : Never (0 points) [No] : In the past 12 months have you used drugs other than those required for medical reasons? No [No falls in past year] : Patient reported no falls in the past year [0] : 2) Feeling down, depressed, or hopeless: Not at all (0) [PHQ-2 Negative - No further assessment needed] : PHQ-2 Negative - No further assessment needed [Never] : Never [NO] : No [With Family] : lives with family [] :  [# Of Children ___] : has [unfilled] children [Sexually Active] : sexually active [Feels Safe at Home] : Feels safe at home [Fully functional (bathing, dressing, toileting, transferring, walking, feeding)] : Fully functional (bathing, dressing, toileting, transferring, walking, feeding) [Fully functional (using the telephone, shopping, preparing meals, housekeeping, doing laundry, using] : Fully functional and needs no help or supervision to perform IADLs (using the telephone, shopping, preparing meals, housekeeping, doing laundry, using transportation, managing medications and managing finances) [Reports normal functional visual acuity (ie: able to read med bottle)] : Reports normal functional visual acuity [Smoke Detector] : smoke detector [Carbon Monoxide Detector] : carbon monoxide detector [Safety elements used in home] : safety elements used in home [Seat Belt] :  uses seat belt [Sunscreen] : uses sunscreen [Patient reported mammogram was normal] : Patient reported mammogram was normal [Patient reported PAP Smear was normal] : Patient reported PAP Smear was normal [Patient reported colonoscopy was abnormal] : Patient reported colonoscopy was abnormal [HIV test declined] : HIV test declined [Hepatitis C test offered] : Hepatitis C test offered [Audit-CScore] : 1 [de-identified] : exercises daily, spins daily, loves to go walking [de-identified] : well-rounded, fruits, vegetables [[id number]] : 0 [High Risk Behavior] : no high risk behavior [Reports changes in hearing] : Reports no changes in hearing [Reports changes in vision] : Reports no changes in vision [Reports changes in dental health] : Reports no changes in dental health [Travel to Developing Areas] : does not  travel to developing areas [TB Exposure] : is not being exposed to tuberculosis [Caregiver Concerns] : does not have caregiver concerns [MammogramDate] : 08/24 [PapSmearDate] : 08/24 [BoneDensityDate] : N/A [ColonoscopyDate] : 07/24 [ColonoscopyComments] : polyp [FreeTextEntry2] :

## 2024-10-01 LAB
25(OH)D3 SERPL-MCNC: 59 NG/ML
ALBUMIN SERPL ELPH-MCNC: 4.4 G/DL
ALP BLD-CCNC: 64 U/L
ALT SERPL-CCNC: 18 U/L
AMORPHOUS PHOSPHATE CRYSTALS: PRESENT
ANION GAP SERPL CALC-SCNC: 11 MMOL/L
APPEARANCE: ABNORMAL
AST SERPL-CCNC: 26 U/L
BACTERIA: ABNORMAL /HPF
BASOPHILS # BLD AUTO: 0.04 K/UL
BASOPHILS NFR BLD AUTO: 1.1 %
BILIRUB SERPL-MCNC: 0.5 MG/DL
BILIRUBIN URINE: NEGATIVE
BLOOD URINE: NEGATIVE
BUN SERPL-MCNC: 17 MG/DL
CALCIUM SERPL-MCNC: 9.6 MG/DL
CAST: 0 /LPF
CHLORIDE SERPL-SCNC: 102 MMOL/L
CHOLEST SERPL-MCNC: 215 MG/DL
CO2 SERPL-SCNC: 28 MMOL/L
COLOR: YELLOW
CREAT SERPL-MCNC: 0.95 MG/DL
EGFR: 71 ML/MIN/1.73M2
EOSINOPHIL # BLD AUTO: 0.3 K/UL
EOSINOPHIL NFR BLD AUTO: 8.3 %
EPITHELIAL CELLS: 15 /HPF
ESTIMATED AVERAGE GLUCOSE: 111 MG/DL
FOLATE SERPL-MCNC: 13.3 NG/ML
GLUCOSE QUALITATIVE U: NEGATIVE MG/DL
GLUCOSE SERPL-MCNC: 77 MG/DL
HBA1C MFR BLD HPLC: 5.5 %
HCT VFR BLD CALC: 44.1 %
HDLC SERPL-MCNC: 93 MG/DL
HGB BLD-MCNC: 13.9 G/DL
IMM GRANULOCYTES NFR BLD AUTO: 0 %
KETONES URINE: NEGATIVE MG/DL
LDLC SERPL CALC-MCNC: 111 MG/DL
LEUKOCYTE ESTERASE URINE: ABNORMAL
LYMPHOCYTES # BLD AUTO: 1.32 K/UL
LYMPHOCYTES NFR BLD AUTO: 36.6 %
MAN DIFF?: NORMAL
MCHC RBC-ENTMCNC: 30.3 PG
MCHC RBC-ENTMCNC: 31.5 GM/DL
MCV RBC AUTO: 96.1 FL
MICROSCOPIC-UA: NORMAL
MONOCYTES # BLD AUTO: 0.35 K/UL
MONOCYTES NFR BLD AUTO: 9.7 %
NEUTROPHILS # BLD AUTO: 1.6 K/UL
NEUTROPHILS NFR BLD AUTO: 44.3 %
NITRITE URINE: NEGATIVE
NONHDLC SERPL-MCNC: 122 MG/DL
PH URINE: 8
PLATELET # BLD AUTO: 215 K/UL
POTASSIUM SERPL-SCNC: 5 MMOL/L
PROT SERPL-MCNC: 6.9 G/DL
PROTEIN URINE: NORMAL MG/DL
RBC # BLD: 4.59 M/UL
RBC # FLD: 13 %
RED BLOOD CELLS URINE: 1 /HPF
REVIEW: NORMAL
SODIUM SERPL-SCNC: 141 MMOL/L
SPECIFIC GRAVITY URINE: 1.02
TRIGL SERPL-MCNC: 64 MG/DL
TSH SERPL-ACNC: 1.9 UIU/ML
UROBILINOGEN URINE: 0.2 MG/DL
VIT B12 SERPL-MCNC: 911 PG/ML
WBC # FLD AUTO: 3.61 K/UL
WHITE BLOOD CELLS URINE: 0 /HPF

## 2025-01-21 ENCOUNTER — APPOINTMENT (OUTPATIENT)
Dept: INTERNAL MEDICINE | Facility: CLINIC | Age: 55
End: 2025-01-21
Payer: COMMERCIAL

## 2025-01-21 VITALS
SYSTOLIC BLOOD PRESSURE: 142 MMHG | TEMPERATURE: 98.7 F | HEIGHT: 64 IN | HEART RATE: 80 BPM | WEIGHT: 130 LBS | DIASTOLIC BLOOD PRESSURE: 92 MMHG | OXYGEN SATURATION: 98 % | BODY MASS INDEX: 22.2 KG/M2

## 2025-01-21 DIAGNOSIS — G43.109 MIGRAINE WITH AURA, NOT INTRACTABLE, W/OUT STATUS MIGRAINOSUS: ICD-10-CM

## 2025-01-21 DIAGNOSIS — R03.0 ELEVATED BLOOD-PRESSURE READING, W/OUT DIAGNOSIS OF HYPERTENSION: ICD-10-CM

## 2025-01-21 DIAGNOSIS — F41.9 ANXIETY DISORDER, UNSPECIFIED: ICD-10-CM

## 2025-01-21 PROCEDURE — G2211 COMPLEX E/M VISIT ADD ON: CPT | Mod: NC

## 2025-01-21 PROCEDURE — 99214 OFFICE O/P EST MOD 30 MIN: CPT

## 2025-01-22 ENCOUNTER — OUTPATIENT (OUTPATIENT)
Dept: OUTPATIENT SERVICES | Facility: HOSPITAL | Age: 55
LOS: 1 days | End: 2025-01-22

## 2025-01-22 DIAGNOSIS — Z00.8 ENCOUNTER FOR OTHER GENERAL EXAMINATION: ICD-10-CM

## 2025-01-24 VITALS — DIASTOLIC BLOOD PRESSURE: 78 MMHG | SYSTOLIC BLOOD PRESSURE: 138 MMHG

## 2025-01-29 ENCOUNTER — APPOINTMENT (OUTPATIENT)
Dept: CT IMAGING | Facility: CLINIC | Age: 55
End: 2025-01-29

## 2025-07-23 ENCOUNTER — RX RENEWAL (OUTPATIENT)
Age: 55
End: 2025-07-23